# Patient Record
Sex: FEMALE | Race: WHITE | NOT HISPANIC OR LATINO | Employment: FULL TIME | URBAN - METROPOLITAN AREA
[De-identification: names, ages, dates, MRNs, and addresses within clinical notes are randomized per-mention and may not be internally consistent; named-entity substitution may affect disease eponyms.]

---

## 2023-03-13 DIAGNOSIS — I10 BENIGN ESSENTIAL HYPERTENSION: Primary | ICD-10-CM

## 2023-03-13 PROBLEM — R31.29 OTHER MICROSCOPIC HEMATURIA: Status: ACTIVE | Noted: 2023-03-13

## 2023-03-13 PROBLEM — M85.80 OSTEOPENIA: Status: ACTIVE | Noted: 2023-03-13

## 2023-03-13 PROBLEM — R92.30 BREAST DENSITY: Status: ACTIVE | Noted: 2023-03-13

## 2023-03-13 PROBLEM — E55.9 VITAMIN D DEFICIENCY: Status: ACTIVE | Noted: 2023-03-13

## 2023-03-13 PROBLEM — E78.5 BORDERLINE HYPERLIPIDEMIA: Status: ACTIVE | Noted: 2023-03-13

## 2023-03-13 PROBLEM — K57.90 DIVERTICULOSIS: Status: ACTIVE | Noted: 2023-03-13

## 2023-03-13 PROBLEM — K64.4 EXTERNAL HEMORRHOIDS: Status: ACTIVE | Noted: 2023-03-13

## 2023-03-13 PROBLEM — D72.819 WBC DECREASED: Status: ACTIVE | Noted: 2023-03-13

## 2023-03-13 PROBLEM — H61.21 IMPACTED CERUMEN OF RIGHT EAR: Status: ACTIVE | Noted: 2023-03-13

## 2023-03-13 RX ORDER — LISINOPRIL 20 MG/1
TABLET ORAL
Qty: 90 TABLET | Refills: 1 | Status: SHIPPED | OUTPATIENT
Start: 2023-03-13 | End: 2023-09-05 | Stop reason: SDUPTHER

## 2023-03-13 RX ORDER — ACETAMINOPHEN 500 MG
1 TABLET ORAL DAILY
COMMUNITY
Start: 2018-08-21

## 2023-03-13 RX ORDER — LISINOPRIL 20 MG/1
20 TABLET ORAL DAILY
COMMUNITY
End: 2023-08-11 | Stop reason: WASHOUT

## 2023-08-09 ASSESSMENT — PROMIS GLOBAL HEALTH SCALE
RATE_AVERAGE_PAIN: 1
RATE_QUALITY_OF_LIFE: EXCELLENT
CARRYOUT_PHYSICAL_ACTIVITIES: COMPLETELY
RATE_SOCIAL_SATISFACTION: EXCELLENT
RATE_MENTAL_HEALTH: EXCELLENT
EMOTIONAL_PROBLEMS: NEVER
RATE_PHYSICAL_HEALTH: EXCELLENT
RATE_GENERAL_HEALTH: EXCELLENT
CARRYOUT_SOCIAL_ACTIVITIES: EXCELLENT

## 2023-08-11 ENCOUNTER — OFFICE VISIT (OUTPATIENT)
Dept: PRIMARY CARE | Facility: CLINIC | Age: 63
End: 2023-08-11
Payer: COMMERCIAL

## 2023-08-11 VITALS
WEIGHT: 129.7 LBS | HEART RATE: 64 BPM | BODY MASS INDEX: 22.98 KG/M2 | DIASTOLIC BLOOD PRESSURE: 75 MMHG | SYSTOLIC BLOOD PRESSURE: 118 MMHG | HEIGHT: 63 IN | RESPIRATION RATE: 14 BRPM

## 2023-08-11 DIAGNOSIS — Z00.00 WELL ADULT EXAM: Primary | ICD-10-CM

## 2023-08-11 DIAGNOSIS — E55.9 VITAMIN D DEFICIENCY: ICD-10-CM

## 2023-08-11 DIAGNOSIS — M85.80 OSTEOPENIA, UNSPECIFIED LOCATION: ICD-10-CM

## 2023-08-11 DIAGNOSIS — I10 BENIGN ESSENTIAL HYPERTENSION: ICD-10-CM

## 2023-08-11 LAB
ALANINE AMINOTRANSFERASE (SGPT) (U/L) IN SER/PLAS: 19 U/L (ref 7–45)
ALBUMIN (G/DL) IN SER/PLAS: 4.7 G/DL (ref 3.4–5)
ALKALINE PHOSPHATASE (U/L) IN SER/PLAS: 86 U/L (ref 33–136)
ANION GAP IN SER/PLAS: 13 MMOL/L (ref 10–20)
APPEARANCE, URINE: NORMAL
ASPARTATE AMINOTRANSFERASE (SGOT) (U/L) IN SER/PLAS: 21 U/L (ref 9–39)
BASOPHILS (10*3/UL) IN BLOOD BY AUTOMATED COUNT: 0.01 X10E9/L (ref 0–0.1)
BASOPHILS/100 LEUKOCYTES IN BLOOD BY AUTOMATED COUNT: 0.2 % (ref 0–2)
BILIRUBIN TOTAL (MG/DL) IN SER/PLAS: 1 MG/DL (ref 0–1.2)
BILIRUBIN, URINE: NEGATIVE
BLOOD, URINE: NEGATIVE
CALCIDIOL (25 OH VITAMIN D3) (NG/ML) IN SER/PLAS: 47 NG/ML
CALCIUM (MG/DL) IN SER/PLAS: 9.9 MG/DL (ref 8.6–10.6)
CARBON DIOXIDE, TOTAL (MMOL/L) IN SER/PLAS: 27 MMOL/L (ref 21–32)
CHLORIDE (MMOL/L) IN SER/PLAS: 104 MMOL/L (ref 98–107)
CHOLESTEROL (MG/DL) IN SER/PLAS: 267 MG/DL (ref 0–199)
CHOLESTEROL IN HDL (MG/DL) IN SER/PLAS: 84.2 MG/DL
CHOLESTEROL/HDL RATIO: 3.2
COLOR, URINE: YELLOW
CREATININE (MG/DL) IN SER/PLAS: 0.93 MG/DL (ref 0.5–1.05)
EOSINOPHILS (10*3/UL) IN BLOOD BY AUTOMATED COUNT: 0.04 X10E9/L (ref 0–0.7)
EOSINOPHILS/100 LEUKOCYTES IN BLOOD BY AUTOMATED COUNT: 0.9 % (ref 0–6)
ERYTHROCYTE DISTRIBUTION WIDTH (RATIO) BY AUTOMATED COUNT: 12.8 % (ref 11.5–14.5)
ERYTHROCYTE MEAN CORPUSCULAR HEMOGLOBIN CONCENTRATION (G/DL) BY AUTOMATED: 33.8 G/DL (ref 32–36)
ERYTHROCYTE MEAN CORPUSCULAR VOLUME (FL) BY AUTOMATED COUNT: 87 FL (ref 80–100)
ERYTHROCYTES (10*6/UL) IN BLOOD BY AUTOMATED COUNT: 4.91 X10E12/L (ref 4–5.2)
GFR FEMALE: 70 ML/MIN/1.73M2
GLUCOSE (MG/DL) IN SER/PLAS: 92 MG/DL (ref 74–99)
GLUCOSE, URINE: NEGATIVE MG/DL
HEMATOCRIT (%) IN BLOOD BY AUTOMATED COUNT: 42.9 % (ref 36–46)
HEMOGLOBIN (G/DL) IN BLOOD: 14.5 G/DL (ref 12–16)
IMMATURE GRANULOCYTES/100 LEUKOCYTES IN BLOOD BY AUTOMATED COUNT: 0.2 % (ref 0–0.9)
KETONES, URINE: NEGATIVE MG/DL
LDL: 169 MG/DL (ref 0–99)
LEUKOCYTE ESTERASE, URINE: NEGATIVE
LEUKOCYTES (10*3/UL) IN BLOOD BY AUTOMATED COUNT: 4.3 X10E9/L (ref 4.4–11.3)
LYMPHOCYTES (10*3/UL) IN BLOOD BY AUTOMATED COUNT: 1.34 X10E9/L (ref 1.2–4.8)
LYMPHOCYTES/100 LEUKOCYTES IN BLOOD BY AUTOMATED COUNT: 31.5 % (ref 13–44)
MONOCYTES (10*3/UL) IN BLOOD BY AUTOMATED COUNT: 0.41 X10E9/L (ref 0.1–1)
MONOCYTES/100 LEUKOCYTES IN BLOOD BY AUTOMATED COUNT: 9.6 % (ref 2–10)
NEUTROPHILS (10*3/UL) IN BLOOD BY AUTOMATED COUNT: 2.45 X10E9/L (ref 1.2–7.7)
NEUTROPHILS/100 LEUKOCYTES IN BLOOD BY AUTOMATED COUNT: 57.6 % (ref 40–80)
NITRITE, URINE: NEGATIVE
NRBC (PER 100 WBCS) BY AUTOMATED COUNT: 0 /100 WBC (ref 0–0)
PH, URINE: 5 (ref 5–8)
PLATELETS (10*3/UL) IN BLOOD AUTOMATED COUNT: 180 X10E9/L (ref 150–450)
POTASSIUM (MMOL/L) IN SER/PLAS: 4.3 MMOL/L (ref 3.5–5.3)
PROTEIN TOTAL: 7.4 G/DL (ref 6.4–8.2)
PROTEIN, URINE: NEGATIVE MG/DL
SODIUM (MMOL/L) IN SER/PLAS: 140 MMOL/L (ref 136–145)
SPECIFIC GRAVITY, URINE: 1.01 (ref 1–1.03)
THYROTROPIN (MIU/L) IN SER/PLAS BY DETECTION LIMIT <= 0.05 MIU/L: 0.93 MIU/L (ref 0.44–3.98)
TRIGLYCERIDE (MG/DL) IN SER/PLAS: 71 MG/DL (ref 0–149)
UREA NITROGEN (MG/DL) IN SER/PLAS: 22 MG/DL (ref 6–23)
UROBILINOGEN, URINE: <2 MG/DL (ref 0–1.9)
VLDL: 14 MG/DL (ref 0–40)

## 2023-08-11 PROCEDURE — 93000 ELECTROCARDIOGRAM COMPLETE: CPT | Performed by: INTERNAL MEDICINE

## 2023-08-11 PROCEDURE — 84443 ASSAY THYROID STIM HORMONE: CPT

## 2023-08-11 PROCEDURE — 3078F DIAST BP <80 MM HG: CPT | Performed by: INTERNAL MEDICINE

## 2023-08-11 PROCEDURE — 82306 VITAMIN D 25 HYDROXY: CPT

## 2023-08-11 PROCEDURE — 85025 COMPLETE CBC W/AUTO DIFF WBC: CPT

## 2023-08-11 PROCEDURE — 80061 LIPID PANEL: CPT

## 2023-08-11 PROCEDURE — 81003 URINALYSIS AUTO W/O SCOPE: CPT

## 2023-08-11 PROCEDURE — 80053 COMPREHEN METABOLIC PANEL: CPT

## 2023-08-11 PROCEDURE — 99396 PREV VISIT EST AGE 40-64: CPT | Performed by: INTERNAL MEDICINE

## 2023-08-11 PROCEDURE — 3074F SYST BP LT 130 MM HG: CPT | Performed by: INTERNAL MEDICINE

## 2023-08-11 PROCEDURE — 1036F TOBACCO NON-USER: CPT | Performed by: INTERNAL MEDICINE

## 2023-08-11 ASSESSMENT — ENCOUNTER SYMPTOMS
WHEEZING: 0
WOUND: 0
DIFFICULTY URINATING: 0
EYE ITCHING: 0
MYALGIAS: 0
DIARRHEA: 0
POLYPHAGIA: 0
APPETITE CHANGE: 0
ABDOMINAL PAIN: 0
CONSTIPATION: 0
CHILLS: 0
NUMBNESS: 0
COUGH: 0
JOINT SWELLING: 0
HEADACHES: 0
SHORTNESS OF BREATH: 0
DIZZINESS: 0
EYE PAIN: 0
FREQUENCY: 0
FATIGUE: 0
ACTIVITY CHANGE: 0
DYSPHORIC MOOD: 0
BRUISES/BLEEDS EASILY: 0
POLYDIPSIA: 0
WEAKNESS: 0
VOMITING: 0
CONFUSION: 0
FACIAL SWELLING: 0
SLEEP DISTURBANCE: 0
RHINORRHEA: 0
BACK PAIN: 0
ARTHRALGIAS: 0
HEMATURIA: 0
SINUS PRESSURE: 0
FEVER: 0
PALPITATIONS: 0
SORE THROAT: 0
FLANK PAIN: 0
CHEST TIGHTNESS: 0
ADENOPATHY: 0
DIAPHORESIS: 0
NAUSEA: 0
LIGHT-HEADEDNESS: 0
NERVOUS/ANXIOUS: 0
BLOOD IN STOOL: 0
DYSURIA: 0
NECK PAIN: 0
EYE DISCHARGE: 0

## 2023-08-11 NOTE — ASSESSMENT & PLAN NOTE
mild thus advise continued calcium, vitamin D supplement and weightbearing exercises  Advise rechecking DEXA bone density November 2023

## 2023-08-11 NOTE — ASSESSMENT & PLAN NOTE
Component white coat hypertension with ambulatory BPs excellent   Continue Lisinopril 20 mg daily; continue check ambulatory blood pressures, DASH diet and exercise;   Previously offered echo - patient declined

## 2023-08-11 NOTE — ASSESSMENT & PLAN NOTE
Fasting blood work/UA ordered  EKG done in office  Pap 6/15/20 with Dr Aguilar  Mammogram 9/21/2022 with Dr Aguilar   Dexa bone density 11/22/21   Colonoscopy 11/16/2022, repeat 5 years

## 2023-08-11 NOTE — PROGRESS NOTES
"Subjective   Patient ID: Alisia Eisenberg is a 62 y.o. female who presents for Annual Exam.    She brought in record of her home Bps: 119/75, 110/74, 121/81, 119/72, 120/80, 130/76, 130/93, 129/88, 109/77, 126/74, 118/75    Current exercise regimen includes spinning, running and walking with some weights.     She reports skin of right arm itching but no rash        Review of Systems   Constitutional:  Negative for activity change, appetite change, chills, diaphoresis, fatigue and fever.   HENT:  Negative for congestion, ear discharge, ear pain, facial swelling, postnasal drip, rhinorrhea, sinus pressure and sore throat.    Eyes:  Negative for pain, discharge and itching.   Respiratory:  Negative for cough, chest tightness, shortness of breath and wheezing.    Cardiovascular:  Negative for chest pain, palpitations and leg swelling.   Gastrointestinal:  Negative for abdominal pain, blood in stool, constipation, diarrhea, nausea and vomiting.   Endocrine: Negative for cold intolerance, heat intolerance, polydipsia, polyphagia and polyuria.   Genitourinary:  Negative for difficulty urinating, dysuria, flank pain, frequency and hematuria.   Musculoskeletal:  Negative for arthralgias, back pain, joint swelling, myalgias and neck pain.   Skin:  Negative for rash and wound.        \"Itchy skin\" right arm   Neurological:  Negative for dizziness, syncope, weakness, light-headedness, numbness and headaches.   Hematological:  Negative for adenopathy. Does not bruise/bleed easily.   Psychiatric/Behavioral:  Negative for behavioral problems, confusion, dysphoric mood, sleep disturbance and suicidal ideas. The patient is not nervous/anxious.        Objective   /75 Comment: Home BP  Pulse 64   Resp 14   Ht 1.6 m (5' 3\")   Wt 58.8 kg (129 lb 11.2 oz)   BMI 22.98 kg/m²     Physical Exam  Constitutional:       Appearance: Normal appearance. She is normal weight. She is not ill-appearing.   HENT:      Head: Normocephalic " and atraumatic.      Right Ear: Tympanic membrane, ear canal and external ear normal.      Left Ear: Tympanic membrane, ear canal and external ear normal.      Nose: Nose normal.      Mouth/Throat:      Mouth: Mucous membranes are moist.      Pharynx: Oropharynx is clear.   Eyes:      General: No scleral icterus.     Extraocular Movements: Extraocular movements intact.      Conjunctiva/sclera: Conjunctivae normal.      Pupils: Pupils are equal, round, and reactive to light.   Cardiovascular:      Rate and Rhythm: Normal rate and regular rhythm.      Heart sounds: No murmur heard.     No gallop.   Pulmonary:      Effort: Pulmonary effort is normal. No respiratory distress.      Breath sounds: No wheezing, rhonchi or rales.   Chest:      Comments: Breast exam deferred to Dr Aguilar  Abdominal:      General: Bowel sounds are normal. There is no distension.      Palpations: Abdomen is soft. There is no mass.      Tenderness: There is no abdominal tenderness.      Hernia: No hernia is present.   Genitourinary:     Comments: Pelvic exam deferred to Dr Aguilar    Musculoskeletal:         General: No swelling, tenderness or deformity. Normal range of motion.      Cervical back: Normal range of motion and neck supple.   Skin:     General: Skin is warm and dry.      Findings: No rash.   Neurological:      General: No focal deficit present.      Mental Status: She is alert and oriented to person, place, and time.      Cranial Nerves: No cranial nerve deficit.      Motor: No weakness.      Deep Tendon Reflexes: Reflexes normal.   Psychiatric:         Mood and Affect: Mood normal.         Behavior: Behavior normal.         Thought Content: Thought content normal.         Judgment: Judgment normal.       Assessment/Plan   Problem List Items Addressed This Visit       Benign essential hypertension - Primary     Component white coat hypertension with ambulatory BPs excellent   Continue Lisinopril 20 mg daily; continue check  ambulatory blood pressures, DASH diet and exercise;   Previously offered echo - patient declined         Relevant Orders    Comprehensive Metabolic Panel    ECG 12 lead    Osteopenia     mild thus advise continued calcium, vitamin D supplement and weightbearing exercises  Advise rechecking DEXA bone density November 2023           Relevant Orders    Vitamin D, Total    Vitamin D deficiency     Continues vitamin D 2000 international units daily   vitamin D 25-hydroxy and further advise         Relevant Orders    Vitamin D, Total    Well adult exam     Fasting blood work/UA ordered  EKG done in office  Pap 6/15/20 with Dr Aguilar  Mammogram 9/21/2022 with Dr Aguilar   Dexa bone density 11/22/21   Colonoscopy 11/16/2022, repeat 5 years          Relevant Orders    CBC and Auto Differential    Comprehensive Metabolic Panel    ECG 12 lead    Lipid Panel    Vitamin D, Total    Urinalysis with Reflex Microscopic and Culture    TSH with reflex to Free T4 if abnormal

## 2023-09-05 DIAGNOSIS — I10 BENIGN ESSENTIAL HYPERTENSION: ICD-10-CM

## 2023-09-05 RX ORDER — LISINOPRIL 20 MG/1
20 TABLET ORAL DAILY
Qty: 90 TABLET | Refills: 1 | Status: SHIPPED | OUTPATIENT
Start: 2023-09-05 | End: 2023-12-15 | Stop reason: SDUPTHER

## 2023-10-31 RX ORDER — DEXTROMETHORPHAN HBR. AND GUAIFENESIN 10; 100 MG/5ML; MG/5ML
5 SOLUTION ORAL 4 TIMES DAILY PRN
COMMUNITY
Start: 2016-01-15 | End: 2023-11-01

## 2023-11-01 ENCOUNTER — ANCILLARY PROCEDURE (OUTPATIENT)
Dept: RADIOLOGY | Facility: CLINIC | Age: 63
End: 2023-11-01
Payer: COMMERCIAL

## 2023-11-01 ENCOUNTER — OFFICE VISIT (OUTPATIENT)
Dept: OBSTETRICS AND GYNECOLOGY | Facility: CLINIC | Age: 63
End: 2023-11-01
Payer: COMMERCIAL

## 2023-11-01 VITALS
DIASTOLIC BLOOD PRESSURE: 88 MMHG | HEIGHT: 63 IN | BODY MASS INDEX: 23.74 KG/M2 | WEIGHT: 134 LBS | SYSTOLIC BLOOD PRESSURE: 136 MMHG

## 2023-11-01 DIAGNOSIS — Z01.419 WELL WOMAN EXAM: Primary | ICD-10-CM

## 2023-11-01 DIAGNOSIS — Z12.4 CERVICAL CANCER SCREENING: ICD-10-CM

## 2023-11-01 DIAGNOSIS — Z12.31 ENCOUNTER FOR SCREENING MAMMOGRAM FOR MALIGNANT NEOPLASM OF BREAST: ICD-10-CM

## 2023-11-01 PROCEDURE — 87624 HPV HI-RISK TYP POOLED RSLT: CPT

## 2023-11-01 PROCEDURE — 99396 PREV VISIT EST AGE 40-64: CPT | Performed by: OBSTETRICS & GYNECOLOGY

## 2023-11-01 PROCEDURE — 1036F TOBACCO NON-USER: CPT | Performed by: OBSTETRICS & GYNECOLOGY

## 2023-11-01 PROCEDURE — 77067 SCR MAMMO BI INCL CAD: CPT

## 2023-11-01 PROCEDURE — 3075F SYST BP GE 130 - 139MM HG: CPT | Performed by: OBSTETRICS & GYNECOLOGY

## 2023-11-01 PROCEDURE — 3079F DIAST BP 80-89 MM HG: CPT | Performed by: OBSTETRICS & GYNECOLOGY

## 2023-11-01 PROCEDURE — 77067 SCR MAMMO BI INCL CAD: CPT | Mod: BILATERAL PROCEDURE | Performed by: RADIOLOGY

## 2023-11-01 PROCEDURE — 77063 BREAST TOMOSYNTHESIS BI: CPT

## 2023-11-01 PROCEDURE — 77063 BREAST TOMOSYNTHESIS BI: CPT | Mod: BILATERAL PROCEDURE | Performed by: RADIOLOGY

## 2023-11-01 PROCEDURE — 88141 CYTOPATH C/V INTERPRET: CPT | Performed by: STUDENT IN AN ORGANIZED HEALTH CARE EDUCATION/TRAINING PROGRAM

## 2023-11-01 PROCEDURE — 88142 CYTOPATH C/V THIN LAYER: CPT

## 2023-11-01 ASSESSMENT — ENCOUNTER SYMPTOMS
VOMITING: 0
FLANK PAIN: 0
CONSTIPATION: 0
UNEXPECTED WEIGHT CHANGE: 0
COLOR CHANGE: 0
HEMATURIA: 0
APPETITE CHANGE: 0
DYSURIA: 0
FEVER: 0
ABDOMINAL PAIN: 0
FREQUENCY: 0
FATIGUE: 0
BACK PAIN: 0
SLEEP DISTURBANCE: 0
ABDOMINAL DISTENTION: 0
CHILLS: 0
DIARRHEA: 0
BLOOD IN STOOL: 0
NAUSEA: 0
SHORTNESS OF BREATH: 0

## 2023-11-01 ASSESSMENT — PAIN SCALES - GENERAL: PAINLEVEL: 0-NO PAIN

## 2023-11-01 NOTE — PROGRESS NOTES
History Of Present Illness  Routine Gyn Exam  Alisia Eisenberg here for routine WWE.  Pt is postmenopausal.  Denies spotting or bleeding.     Current complaints: none.     New health issues or surgeries since last visit: denies.  Exercise: regular: walking, spinning, weights.     Gynecologic History  Postmenopausal.  Sexually active: yes with one partner/.  Last Pap: .   Last mammogram: this morning.   Last Colonoscopy:  .   Last DEXA:  (normal).     Obstetric History  OB History    Para Term  AB Living   4 3 3   1 3   SAB IAB Ectopic Multiple Live Births   1       3      # Outcome Date GA Lbr Kendrick/2nd Weight Sex Delivery Anes PTL Lv   4 Term 99     Vag-Spont   BOGDAN   3 Term 94     Vag-Spont   BOGDAN   2 SAB 1993           1 Term 91     Vag-Spont   BOGDAN        Past Medical History  She has a past medical history of Hypertension and Personal history of other mental and behavioral disorders (2014).    Surgical History  She has a past surgical history that includes Other surgical history (2016).     Social History  She reports that she has never smoked. She has never used smokeless tobacco. She reports current alcohol use of about 3.0 standard drinks of alcohol per week. She reports that she does not use drugs.    Family History  Family History   Problem Relation Name Age of Onset    Arthritis Mother Johanna Andersen     Cancer Mother Johanna Andersen     Diabetes Mother Johanna Andersen     Hearing loss Mother Johanna Andersen     Hypertension Mother Johanna Andersen     Breast cancer Mother Johanna Andersen     Other (Adenocarcinoma in situ of cervix) Mother Johanna Andersen     Cancer Sister Erin yoke     Depression Sister Erin yosonam     Hypertension Sister Erin yosonam     Breast cancer Sister Erin yosonam     Colon cancer Sister Erin yoke     BRCA1 Negative Sister Erin yosonam     Rectal cancer Sister Erin yosonam     Depression Daughter Nhi  "Faina     Birth defects Son Richard Eisenberg     Arthritis Mother's Sister Lorena Osullivan     Arthritis Maternal Grandmother Lydia Low     Cancer Maternal Grandmother Lydia Low     Hypertension Maternal Grandmother Lydia Low     Colon cancer Maternal Grandmother Lydia Low     Hypertension Paternal Grandmother Mounika Andersen     Colon cancer Other Grandmother     Colon cancer Other Grandfather         Allergies  Amoxicillin    Review of Systems   Constitutional:  Negative for appetite change, chills, fatigue, fever and unexpected weight change.   Respiratory:  Negative for shortness of breath.    Cardiovascular:  Negative for chest pain.   Gastrointestinal:  Negative for abdominal distention, abdominal pain, blood in stool, constipation, diarrhea, nausea and vomiting.   Endocrine: Negative for cold intolerance and heat intolerance.   Genitourinary:  Negative for dyspareunia, dysuria, flank pain, frequency, genital sores, hematuria, menstrual problem, pelvic pain, urgency, vaginal bleeding, vaginal discharge and vaginal pain.   Musculoskeletal:  Negative for back pain.   Skin:  Negative for color change.   Psychiatric/Behavioral:  Negative for sleep disturbance.        /88   Ht 1.6 m (5' 3\")   Wt 60.8 kg (134 lb)   BMI 23.74 kg/m²      Physical Exam  Constitutional:       Appearance: Normal appearance.   HENT:      Head: Normocephalic and atraumatic.   Chest:   Breasts:     Right: Normal.      Left: Normal.   Abdominal:      General: Abdomen is flat.      Palpations: Abdomen is soft.      Tenderness: There is no abdominal tenderness.   Genitourinary:     General: Normal vulva.      Vagina: Normal.      Cervix: Normal.      Uterus: Normal.       Adnexa: Right adnexa normal and left adnexa normal.      Comments: Pap smear collected today   Skin:     General: Skin is warm and dry.   Neurological:      Mental Status: She is alert and oriented to person, place, and time.   Psychiatric:        " " Mood and Affect: Mood normal.          Last Recorded Vitals  Blood pressure 136/88, height 1.6 m (5' 3\"), weight 60.8 kg (134 lb).         Assessment/Plan         Routine Well Woman Exam Today  Discussed diet and exercise.   Reviewed routine health screenings.   Pap: pap done today   Recommend annual mammograms.                Sindhu Aguilar MD  "

## 2023-11-17 LAB
CYTOLOGY CMNT CVX/VAG CYTO-IMP: NORMAL
HPV HR 12 DNA GENITAL QL NAA+PROBE: NEGATIVE
HPV HR GENOTYPES PNL CVX NAA+PROBE: NEGATIVE
HPV16 DNA SPEC QL NAA+PROBE: NEGATIVE
HPV18 DNA SPEC QL NAA+PROBE: NEGATIVE
LAB AP HPV GENOTYPE QUESTION: YES
LAB AP HPV HR: NORMAL
LABORATORY COMMENT REPORT: NORMAL
LABORATORY COMMENT REPORT: NORMAL
PATH REPORT.TOTAL CANCER: NORMAL

## 2023-12-15 DIAGNOSIS — I10 BENIGN ESSENTIAL HYPERTENSION: ICD-10-CM

## 2023-12-15 RX ORDER — LISINOPRIL 20 MG/1
20 TABLET ORAL DAILY
Qty: 90 TABLET | Refills: 0 | Status: SHIPPED | OUTPATIENT
Start: 2023-12-15

## 2024-10-08 ENCOUNTER — HOSPITAL ENCOUNTER (OUTPATIENT)
Dept: RADIOLOGY | Facility: CLINIC | Age: 64
Discharge: HOME | End: 2024-10-08
Payer: COMMERCIAL

## 2024-10-08 DIAGNOSIS — Z13.6 ENCOUNTER FOR SCREENING FOR CARDIOVASCULAR DISORDERS: ICD-10-CM

## 2024-10-08 PROCEDURE — 75571 CT HRT W/O DYE W/CA TEST: CPT

## 2024-11-04 ENCOUNTER — APPOINTMENT (OUTPATIENT)
Dept: OBSTETRICS AND GYNECOLOGY | Facility: CLINIC | Age: 64
End: 2024-11-04
Payer: COMMERCIAL

## 2024-11-04 ENCOUNTER — HOSPITAL ENCOUNTER (OUTPATIENT)
Dept: RADIOLOGY | Facility: CLINIC | Age: 64
Discharge: HOME | End: 2024-11-04
Payer: COMMERCIAL

## 2024-11-04 VITALS — BODY MASS INDEX: 22.15 KG/M2 | HEIGHT: 63 IN | WEIGHT: 125 LBS

## 2024-11-04 VITALS
BODY MASS INDEX: 22.15 KG/M2 | DIASTOLIC BLOOD PRESSURE: 90 MMHG | HEIGHT: 63 IN | SYSTOLIC BLOOD PRESSURE: 140 MMHG | WEIGHT: 125 LBS

## 2024-11-04 DIAGNOSIS — Z12.31 ENCOUNTER FOR SCREENING MAMMOGRAM FOR MALIGNANT NEOPLASM OF BREAST: ICD-10-CM

## 2024-11-04 DIAGNOSIS — Z01.419 WELL WOMAN EXAM: Primary | ICD-10-CM

## 2024-11-04 PROCEDURE — 77063 BREAST TOMOSYNTHESIS BI: CPT | Performed by: RADIOLOGY

## 2024-11-04 PROCEDURE — 3080F DIAST BP >= 90 MM HG: CPT | Performed by: OBSTETRICS & GYNECOLOGY

## 2024-11-04 PROCEDURE — 3008F BODY MASS INDEX DOCD: CPT | Performed by: OBSTETRICS & GYNECOLOGY

## 2024-11-04 PROCEDURE — 99396 PREV VISIT EST AGE 40-64: CPT | Performed by: OBSTETRICS & GYNECOLOGY

## 2024-11-04 PROCEDURE — 1036F TOBACCO NON-USER: CPT | Performed by: OBSTETRICS & GYNECOLOGY

## 2024-11-04 PROCEDURE — 77067 SCR MAMMO BI INCL CAD: CPT

## 2024-11-04 PROCEDURE — 77067 SCR MAMMO BI INCL CAD: CPT | Performed by: RADIOLOGY

## 2024-11-04 PROCEDURE — 3077F SYST BP >= 140 MM HG: CPT | Performed by: OBSTETRICS & GYNECOLOGY

## 2024-11-04 ASSESSMENT — PAIN SCALES - GENERAL: PAINLEVEL_OUTOF10: 0-NO PAIN

## 2024-11-04 ASSESSMENT — ENCOUNTER SYMPTOMS
SLEEP DISTURBANCE: 0
ABDOMINAL DISTENTION: 0
DYSURIA: 0
FEVER: 0
BACK PAIN: 0
ABDOMINAL PAIN: 0
HEMATURIA: 0
FREQUENCY: 0
CHILLS: 0
BLOOD IN STOOL: 0
DIARRHEA: 0
FLANK PAIN: 0
APPETITE CHANGE: 0
VOMITING: 0
SHORTNESS OF BREATH: 0
NAUSEA: 0
UNEXPECTED WEIGHT CHANGE: 0
COLOR CHANGE: 0
CONSTIPATION: 0
FATIGUE: 0

## 2024-11-04 NOTE — PROGRESS NOTES
"History Of Present Illness  Routine Gyn Exam  Alisia Eisenberg here for routine WWE.  Pt is postmenopausal.  Denies spotting or bleeding.     Concerns: none.     Medical and surgical histories reviewed with patient.   Exercise: walking, weights, jogging, spinning.     Gynecologic History  Postmenopausal.  Sexually active: yes with one partner/ .  Last Pap: .   Last mammogram: .   Last Colonoscopy:  .   Last DEXA: .     Obstetric History  OB History    Para Term  AB Living   4 3 3   1 3   SAB IAB Ectopic Multiple Live Births   1       3      # Outcome Date GA Lbr Kendrick/2nd Weight Sex Type Anes PTL Lv   4 Term 99     Vag-Spont   BOGDAN   3 Term 94     Vag-Spont   BOGDAN   2 SAB            1 Term 91     Vag-Spont   BOGDAN        Review of Systems   Constitutional:  Negative for appetite change, chills, fatigue, fever and unexpected weight change.   Respiratory:  Negative for shortness of breath.    Cardiovascular:  Negative for chest pain.   Gastrointestinal:  Negative for abdominal distention, abdominal pain, blood in stool, constipation, diarrhea, nausea and vomiting.   Endocrine: Negative for cold intolerance and heat intolerance.   Genitourinary:  Negative for dyspareunia, dysuria, flank pain, frequency, genital sores, hematuria, menstrual problem, pelvic pain, urgency, vaginal bleeding, vaginal discharge and vaginal pain.   Musculoskeletal:  Negative for back pain.   Skin:  Negative for color change.   Psychiatric/Behavioral:  Negative for sleep disturbance.        /90 (BP Location: Right arm, Patient Position: Sitting)   Ht 1.6 m (5' 3\")   Wt 56.7 kg (125 lb)   BMI 22.14 kg/m²      Physical Exam  Constitutional:       Appearance: Normal appearance.   HENT:      Head: Normocephalic and atraumatic.   Chest:   Breasts:     Right: Normal.      Left: Normal.   Abdominal:      General: Abdomen is flat.      Palpations: Abdomen is soft.      Tenderness: There " is no abdominal tenderness.   Genitourinary:     General: Normal vulva.      Vagina: Normal.      Cervix: Normal.      Uterus: Normal.       Adnexa: Right adnexa normal and left adnexa normal.   Skin:     General: Skin is warm and dry.   Neurological:      Mental Status: She is alert and oriented to person, place, and time.   Psychiatric:         Mood and Affect: Mood normal.              Assessment/Plan         Routine Well Woman Exam Today  Discussed diet and exercise.   Reviewed routine health screenings.   Pap: pap 2023 wnl   Recommend annual mammograms. Mammogram scheduled for today  Currently up to date on colon cancer screening.                  Sindhu Aguilar MD

## 2024-11-08 ENCOUNTER — TELEPHONE (OUTPATIENT)
Dept: OBSTETRICS AND GYNECOLOGY | Facility: CLINIC | Age: 64
End: 2024-11-08
Payer: COMMERCIAL

## 2024-11-08 DIAGNOSIS — R92.8 ABNORMAL MAMMOGRAM: ICD-10-CM

## 2024-11-08 NOTE — TELEPHONE ENCOUNTER
Called patient to discuss results and orders placed  Identified by name and   Informed patient of results and recommenced follow up according to mammogram notes  Discussed that RN pended orders to provider to review and sign, and once these are in patient my [facility] to get follow up scheduled  Patient verbalized understanding, all questions/concerns addressed at this time.    MADI LincolnN RN

## 2024-11-08 NOTE — TELEPHONE ENCOUNTER
pt states that she had a mammogram and it states that she needs a second mammogram and ultrasound. Pt is requesting an order.

## 2024-11-13 ENCOUNTER — HOSPITAL ENCOUNTER (OUTPATIENT)
Dept: RADIOLOGY | Facility: CLINIC | Age: 64
Discharge: HOME | End: 2024-11-13
Payer: COMMERCIAL

## 2024-11-13 DIAGNOSIS — R92.8 ABNORMAL MAMMOGRAM: ICD-10-CM

## 2024-11-13 PROCEDURE — 77061 BREAST TOMOSYNTHESIS UNI: CPT | Mod: RIGHT SIDE | Performed by: STUDENT IN AN ORGANIZED HEALTH CARE EDUCATION/TRAINING PROGRAM

## 2024-11-13 PROCEDURE — 76642 ULTRASOUND BREAST LIMITED: CPT | Mod: RIGHT SIDE | Performed by: STUDENT IN AN ORGANIZED HEALTH CARE EDUCATION/TRAINING PROGRAM

## 2024-11-13 PROCEDURE — 76982 USE 1ST TARGET LESION: CPT | Mod: RT

## 2024-11-13 PROCEDURE — 77065 DX MAMMO INCL CAD UNI: CPT | Mod: RIGHT SIDE | Performed by: STUDENT IN AN ORGANIZED HEALTH CARE EDUCATION/TRAINING PROGRAM

## 2024-11-13 PROCEDURE — 77061 BREAST TOMOSYNTHESIS UNI: CPT | Mod: RT

## 2024-11-13 PROCEDURE — 76642 ULTRASOUND BREAST LIMITED: CPT | Mod: RT

## 2024-11-18 ENCOUNTER — TELEPHONE (OUTPATIENT)
Dept: OBSTETRICS AND GYNECOLOGY | Facility: CLINIC | Age: 64
End: 2024-11-18
Payer: COMMERCIAL

## 2024-11-18 NOTE — TELEPHONE ENCOUNTER
LM for patient to ensure she has follow up scheduled (biopsy) for recent abnormal mammogram findings.

## 2024-11-19 NOTE — TELEPHONE ENCOUNTER
Patient is returning providers call, you may leave a detailed message in the event she cant answer. patient will be busy until 4pm

## 2024-11-26 ENCOUNTER — OFFICE VISIT (OUTPATIENT)
Dept: HEMATOLOGY/ONCOLOGY | Facility: HOSPITAL | Age: 64
End: 2024-11-26
Payer: COMMERCIAL

## 2024-11-26 ENCOUNTER — HOSPITAL ENCOUNTER (OUTPATIENT)
Dept: RADIOLOGY | Facility: HOSPITAL | Age: 64
Discharge: HOME | End: 2024-11-26
Payer: COMMERCIAL

## 2024-11-26 VITALS
WEIGHT: 127.21 LBS | RESPIRATION RATE: 18 BRPM | BODY MASS INDEX: 22.54 KG/M2 | OXYGEN SATURATION: 96 % | HEART RATE: 64 BPM | SYSTOLIC BLOOD PRESSURE: 153 MMHG | DIASTOLIC BLOOD PRESSURE: 82 MMHG | TEMPERATURE: 97.9 F | HEIGHT: 63 IN

## 2024-11-26 DIAGNOSIS — N63.11 MASS OF UPPER OUTER QUADRANT OF RIGHT BREAST: ICD-10-CM

## 2024-11-26 DIAGNOSIS — R92.8 OTHER ABNORMAL AND INCONCLUSIVE FINDINGS ON DIAGNOSTIC IMAGING OF BREAST: ICD-10-CM

## 2024-11-26 DIAGNOSIS — N63.10 BREAST MASS, RIGHT: ICD-10-CM

## 2024-11-26 DIAGNOSIS — Z01.818 PRE-PROCEDURAL EXAMINATION: Primary | ICD-10-CM

## 2024-11-26 PROCEDURE — 3008F BODY MASS INDEX DOCD: CPT | Performed by: NURSE PRACTITIONER

## 2024-11-26 PROCEDURE — 2720000007 HC OR 272 NO HCPCS

## 2024-11-26 PROCEDURE — 99203 OFFICE O/P NEW LOW 30 MIN: CPT | Performed by: NURSE PRACTITIONER

## 2024-11-26 PROCEDURE — 3077F SYST BP >= 140 MM HG: CPT | Performed by: NURSE PRACTITIONER

## 2024-11-26 PROCEDURE — 77065 DX MAMMO INCL CAD UNI: CPT | Mod: RIGHT SIDE | Performed by: RADIOLOGY

## 2024-11-26 PROCEDURE — 3079F DIAST BP 80-89 MM HG: CPT | Performed by: NURSE PRACTITIONER

## 2024-11-26 PROCEDURE — 99213 OFFICE O/P EST LOW 20 MIN: CPT | Performed by: NURSE PRACTITIONER

## 2024-11-26 PROCEDURE — 19083 BX BREAST 1ST LESION US IMAG: CPT | Mod: RIGHT SIDE | Performed by: RADIOLOGY

## 2024-11-26 PROCEDURE — A4648 IMPLANTABLE TISSUE MARKER: HCPCS

## 2024-11-26 PROCEDURE — C1819 TISSUE LOCALIZATION-EXCISION: HCPCS

## 2024-11-26 PROCEDURE — 2500000004 HC RX 250 GENERAL PHARMACY W/ HCPCS (ALT 636 FOR OP/ED): Performed by: RADIOLOGY

## 2024-11-26 PROCEDURE — 19083 BX BREAST 1ST LESION US IMAG: CPT | Mod: RT

## 2024-11-26 ASSESSMENT — PAIN SCALES - GENERAL
PAINLEVEL_OUTOF10: 0 - NO PAIN
PAINLEVEL_OUTOF10: 0 - NO PAIN
PAINLEVEL_OUTOF10: 0-NO PAIN

## 2024-11-26 ASSESSMENT — PAIN - FUNCTIONAL ASSESSMENT: PAIN_FUNCTIONAL_ASSESSMENT: 0-10

## 2024-11-26 ASSESSMENT — PATIENT HEALTH QUESTIONNAIRE - PHQ9
1. LITTLE INTEREST OR PLEASURE IN DOING THINGS: NOT AT ALL
SUM OF ALL RESPONSES TO PHQ9 QUESTIONS 1 & 2: 0
2. FEELING DOWN, DEPRESSED OR HOPELESS: NOT AT ALL

## 2024-11-27 ENCOUNTER — TELEPHONE (OUTPATIENT)
Dept: RADIOLOGY | Facility: HOSPITAL | Age: 64
End: 2024-11-27
Payer: COMMERCIAL

## 2024-11-27 NOTE — TELEPHONE ENCOUNTER
Right breast bx on 11/26/24:  called post biopsy and patient reports that she is doing fine, no problems, no bruising or bleeding.  Waiting for pathology results.

## 2024-11-29 PROBLEM — N63.11 MASS OF UPPER OUTER QUADRANT OF RIGHT BREAST: Status: ACTIVE | Noted: 2024-11-29

## 2024-11-29 PROBLEM — Z01.818 PRE-PROCEDURAL EXAMINATION: Status: ACTIVE | Noted: 2024-11-29

## 2024-11-29 NOTE — PATIENT INSTRUCTIONS
It was nice meeting you today, Fiona.  I will call you in about 5-10 days with the results of your biopsy.  We discussed that your results will be reports to your MyChart immediately. You can choose to look at them knowing that I will be calling though it may not be until later in the day, or, you can wait for my call and we can review them together.   Please call with any concerns at [phone].

## 2024-11-29 NOTE — PROGRESS NOTES
Alisia Eisenberg female   1960 64 y.o.   [id number]      Chief Complaint    New Patient Visit          HPI  Alisia Eisenberg is a 64 y.o.  female referred by Radiology to the [facility] for pre-biopsy H & P    BREAST IMAGING:   Status Exam Begun Exam Ended   Final 11/13/2024 10:26 11/13/2024 10:39     Study Result    Narrative & Impression   Interpreted By:  Odette Jha,   STUDY:  BI MAMMO RIGHT DIAGNOSTIC TOMOSYNTHESIS; BI US BREAST LIMITED RIGHT;  11/13/2024 10:39 am; 11/13/2024 10:56 am      ACCESSION NUMBER(S):  [id number]; [id number]      ORDERING CLINICIAN:  ADAMARIS RALPH      INDICATION:  Callback from screening mammography for a focal asymmetry in the  right breast. Family history of breast cancer.      ,R92.8 Other abnormal and inconclusive findings on diagnostic imaging  of breast      COMPARISON:  11/04/2024 and all relevant prior breast imaging exams available at  the time of dictation.      FINDINGS:  MAMMOGRAPHY: 2D and tomosynthesis images were reviewed at 1 mm slice  thickness.      Density:  There are scattered areas of fibroglandular density.      A fat containing focal asymmetry persist in central lateral right  breast at middle depth corresponding to the finding on screening  mammogram.      ULTRASOUND: Targeted ultrasound with elastography was performed by a  registered sonographer in the right breast and right axilla. There is  an irregular hypoechoic mass with indistinct margins and echogenic  rind with posterior shadowing in anti parallel orientation at 9  o'clock, 5 cm from the nipple measuring 0.6 x 0.8 x 0.7 cm. The mass  has internal vascularity and is stiff on elastography. This  corresponds with the focal asymmetry on mammogram.      Scanning of the right axilla demonstrates 3 morphologically normal  lymph nodes with preserved fatty hilum and normal cortical thickness.      IMPRESSION:  Suspicious right breast mass without axillary  lymphadenopathy.  Further evaluation with surgical consultation and ultrasound-guided  biopsy is recommended.      Dr. Odette Jha discussed the findings and recommendations with  the patient at the time of exam. A message was sent to the referring  clinician at the time of this dictation regarding these findings  using the Epic critical findings reporting system. A pre-procedure  form was filled out.      Method of Detection: Category Sdbt - 3D Screening      BI-RADS CATEGORY:  BI-RADS Category:  4 Suspicious.  Recommendation:  Surgical Consultation and Biopsy.  Recommended Date:  Immediate.  Laterality:  Right.       REPRODUCTIVE HISTORY: menarche age 16  menopause age 52 fatty/scattered/heterogeneously/extremely dense breast tissue     FAMILY CANCER HISTORY:   Mother breast cancer with recurrence.  Sister breast cancer age 30 x2 and colon cancer. Genetic negative.  See below for further details/family members    MEDICAL HISTORY:  HTN  Vitamin D deficiency  Osteopenia    SURGICAL HISTORY:  None    SOCIAL HISTORY:  Never smoked  Rare alcohol use  Denies smokeless tobacco  Does not use drugs    Has 3 adult children    Enjoys cooking, running, reading      REVIEW OF SYSTEMS:  Alisia denies any unusual headaches, balance issues, depression, cough, shortness of breath, problems swallowing, changes in chest/breast area, abdominal pain, bone or muscle pain, vaginal bleeding, rectal bleeding, blood in the urine, vaginal dryness, swelling arms or legs, new or unusual skin moles or lesions.         MEDICATIONS  Current Outpatient Medications   Medication Instructions    cholecalciferol (Vitamin D-3) 50 mcg (2,000 unit) capsule 1 capsule, Daily    lisinopril 20 mg, oral, Daily        ALLERGIES  Allergies   Allergen Reactions    Amoxicillin Diarrhea, Nausea Only and Other     Receives instant yeast inf        Patient Active Problem List    Diagnosis Date Noted    Well adult exam 08/11/2023     Benign essential hypertension 03/13/2023    Borderline hyperlipidemia 03/13/2023    Breast density 03/13/2023    Diverticulosis 03/13/2023    External hemorrhoids 03/13/2023    Impacted cerumen of right ear 03/13/2023    Osteopenia 03/13/2023    Other microscopic hematuria 03/13/2023    Vitamin D deficiency 03/13/2023    WBC decreased 03/13/2023     Past Medical History:   Diagnosis Date    Hypertension     Personal history of other mental and behavioral disorders 12/04/2014    History of depression      Past Surgical History:   Procedure Laterality Date    OTHER SURGICAL HISTORY  01/21/2016    Prior Surgical Procedure Not Done      Family History   Problem Relation Name Age of Onset    Arthritis Mother Johanna Andersen     Cancer Mother Johanna Andersen     Diabetes Mother Johanna Andersen     Hearing loss Mother Johanna Andersen     Hypertension Mother Johanna Andersen     Breast cancer Mother Johanna Andersen     Other (Adenocarcinoma in situ of cervix) Mother Johanna Andersen     Cancer Sister Erin yosonam     Depression Sister Erin yosonam     Hypertension Sister Erin paige     Breast cancer Sister Erin yosonam     Colon cancer Sister Erin paige     BRCA1 Negative Sister Erin yosonam     Rectal cancer Sister Erin yosonam     Depression Daughter Nhi Eisenberg     Birth defects Son Richard Eisenberg     Arthritis Mother's Sister Lorena Osullivan     Arthritis Maternal Grandmother Lydia Bertolini     Cancer Maternal Grandmother Lydia Bertolini     Hypertension Maternal Grandmother Lydia Bertolini     Colon cancer Maternal Grandmother Lydia Bertolini     Hypertension Paternal Grandmother Mounika Andersen     Colon cancer Other Grandmother     Colon cancer Other Grandfather           SOCIAL HISTORY      Social History     Tobacco Use    Smoking status: Never    Smokeless tobacco: Never   Substance Use Topics    Alcohol use: Yes     Alcohol/week: 3.0 standard drinks of alcohol     Types: 2 Glasses of wine, 1 Standard drinks or  equivalent per week        VITALS  Vitals:    11/26/24 1137   BP: 153/82   Pulse: 64   Resp: 18   Temp: 36.6 °C (97.9 °F)   SpO2: 96%        PHYSICAL EXAM  Constitutional: Well developed, alert/oriented x3, no distress, cooperative   Eyes: clear sclera   ENMT: mucous membranes moist, no apparent lesions   Head/Neck: Neck supple, no bruits   Respiratory/Thorax: Patent airways, normal breath sounds with good chest expansion   Cardiovascular: Regular rate and rhythm, no murmurs, 2+ equal pulses of the extremities,   Gastrointestinal: Nondistended, soft, non-tender, no masses palpable, no organomegaly   Musculoskeletal: ROM intact, no joint swelling, normal strength   Extremities: normal extremities, no edema, cyanosis, contusions or wounds   Neurological: alert and oriented x3,  normal strength   Breast:     Lymphatic: No significant lymphadenopathy   Psychological: Appropriate mood and behavior   Skin: Warm and dry, no lesions, no rashes       Physical Exam  Chest:          Comments: Right breast without masses, nodules, skin changes, discharge. Area in question is not palpable. Left breast without masses, nodules, skin changes, discharge.         ORDERS  Orders signed for right ultrasound guided biopsy.         ASSESSMENT/PLAN  Alisia is a 65 yo woman who presents for a right ultrasound guided biopsy for a right central/lateral mass found on routine screening.     Plan:  Alisia may proceed with biopsy today.  I will call in about 5-10 days with the results.   We reviewed that due to government regulations that her pathology results will be reported to her MyChart right away. She can choose to read them knowing that I will be calling though it may not be until the end of the day, or, she can wait for my call to discuss the results.  All of Alisia's questions/concerns were addressed.  Over 15 minutes of time was spent with this patient with >50% of the time with education, counseling, and coordination of  care.       No follow-ups on file.      Nel Mcelroy, [facility]

## 2024-12-03 LAB
LAB AP ASR DISCLAIMER: NORMAL
LABORATORY COMMENT REPORT: NORMAL
PATH REPORT.ADDENDUM SPEC: NORMAL
PATH REPORT.FINAL DX SPEC: NORMAL
PATH REPORT.GROSS SPEC: NORMAL
PATH REPORT.RELEVANT HX SPEC: NORMAL
PATH REPORT.TOTAL CANCER: NORMAL

## 2024-12-08 PROBLEM — D05.11 DUCTAL CARCINOMA IN SITU (DCIS) OF RIGHT BREAST: Status: ACTIVE | Noted: 2024-12-08

## 2024-12-08 ASSESSMENT — ENCOUNTER SYMPTOMS
NEUROLOGICAL NEGATIVE: 1
HEMATOLOGIC/LYMPHATIC NEGATIVE: 1
ENDOCRINE NEGATIVE: 1
PSYCHIATRIC NEGATIVE: 1
CARDIOVASCULAR NEGATIVE: 1
EYES NEGATIVE: 1
GASTROINTESTINAL NEGATIVE: 1
MUSCULOSKELETAL NEGATIVE: 1
CONSTITUTIONAL NEGATIVE: 1
RESPIRATORY NEGATIVE: 1

## 2024-12-08 NOTE — H&P (VIEW-ONLY)
Subjective     Diagnosis date: 24 right breast ductal carcinoma in situ, nuclear grade 1-2, % 3+, cTisN0, stage 0     Cancer Staging   Ductal carcinoma in situ (DCIS) of right breast  Staging form: Breast, AJCC 8th Edition  - Clinical stage from 2024: Stage 0 - Signed by Leticia Sanchez MD on 2024  cTis (DCIS)  cN0  cM0  ER Status: Positive  MT Status: Not assessed  HER2 Status: Not assessed        Alisia Eisenberg is a 64 y.o. female who was referred by: Dr. Sindhu Aguilar  for evaluation of a screen detected right breast DCIS. She presents to the [facility] for treatment recommendations. She proceeded to have diagnostic imaging demonstrating at 9 o'clock position there is a 0.8 cm asymmetry with an US correlate. The area underwent US-guided core biopsy revealing a diagnosis of ductal carcinoma in situ. She denies skin changes or nipple discharge. She has no family history of breast or ovarian cancers.    BREAST IMAGIN24 Bilateral screening mammogram demonstrates scattered fibroglandular density, BI-RADS Category 0, focal asymmetry in the right breast at 9:00 mid depth  24 right diagnostic mammogram and targeted ultrasound, indicates BI-RADS Category 4, A fat containing focal asymmetry persist in central lateral right breast at middle depth corresponding to the finding on screening mammogram. On US, there is an irregular hypoechoic mass with indistinct margins and echogenic rind with posterior shadowing in anti parallel orientation at 9 o'clock, 5 cm from the nipple measuring 0.8 cm. The mass has internal vascularity and is stiff on elastography. This corresponds with the focal asymmetry on mammogram. Scanning of the right axilla demonstrates 3 morphologically normal lymph nodes with preserved fatty hilum and normal cortical thickness.    BREAST PROCEDURE: 24 right breast, 9:00 5cm from nipple, ultrasound-guided core biopsy.    REPRODUCTIVE HEALTH:  menarche at 16, post-menopausal at 52, , age at first birth 30, did not breastfeed, with no hormone exposure such as birth control pills or post menopausal estrogen    MEDICAL HISTORY: hypertension    FAMILY CANCER HISTORY:   Mother with breast cancer at 50  Sister with breast cancer at 30 and colon cancer, negative for pathogenic variant    MEDICAL ONCOLOGY: referral post op if indicated    RADIATION ONCOLOGY: referral post op    GENETICS: meets NCCN criteria, will obtain in a non-urgent fashion    Cancer-related family history includes Breast cancer in her mother and sister; Cancer in her maternal grandmother, mother, and sister; Colon cancer in her maternal grandmother, sister, and other family members; Rectal cancer in her sister.    Review of Systems   Constitutional: Negative.    HENT: Negative.     Eyes: Negative.    Respiratory: Negative.     Cardiovascular: Negative.    Gastrointestinal: Negative.    Endocrine: Negative.    Genitourinary: Negative.    Musculoskeletal: Negative.    Skin: Negative.    Neurological: Negative.    Hematological: Negative.    Psychiatric/Behavioral: Negative.          Objective     Visit Vitals  /86 (BP Location: Right arm, Patient Position: Sitting, BP Cuff Size: Small adult)   Pulse 58   Temp 36.6 °C (97.8 °F) (Temporal)   Wt 59 kg (130 lb)   BMI 23.03 kg/m²   OB Status Postmenopausal   Smoking Status Never   BSA 1.62 m²        Breast: Exam performed in sitting and supine positions.   cup size: DD  RIGHT BREAST EXAM:  Breast: no discrete mass  Skin Erythema: no  Attachment of Overlying Skin: no  Peau d' orange: no  Chest Wall Attachment: no  Nipple Inversion: no  Nipple Discharge: no     LEFT BREAST EXAM:  Breast: no discrete mass  Skin Erythema: no  Peau d' orange: no  Nipple Inversion: no  Nipple Discharge: no     RIGHT KANCHAN BASIN EXAM:  Axillary: negative  Infraclavicular: negative  Supraclavicular: negative     LEFT KANCHAN BASIN EXAM:  Axillary:  negative  Infraclavicular: negative  Supraclavicular: negative        General: Otherwise healthy appearing. Patient is in no acute distress.     HEENT: Conjunctivae are well colored and sclerae nonicteric. Neck is supple, trachea midline. No lymphadenopathy or thyromegaly.     Chest: Respiratory rate and effort normal. No cough.     CV: regular rate and rhythm.     Abdomen: Abdomen is non-distended, non-tender to palpation.     Extremities: Extremities are atraumatic. There is no evidence of lymphedema.    Shoulder abduction test: (raising affected arm(s) from lateral to 180 degrees overhead, one at a time) no limitations     Neurologic: Neurologically intact. Alert and oriented.        Imaging    Images from diagnostic mammogram and ultrasound were reviewed with breast imaging and results were shared with Ms. Eisenberg today.    Assessment and Plan    The natural history and evolution of DCIS was discussed with MsClaus FisherFaina and her partner Ed. This included the difference between in-situ and invasive carcinoma, and the distinction between local and systemic disease and local and systemic therapy. For local treatment options, I explained the risks and benefits of breast conservation and mastectomy (with or without reconstruction), including the fact that survival rates are equal with these two approaches, which is essentially 100% with non-invasive disease. If breast conservation is elected, I explained the need for free margins, the possibility of re-excision to achieve free margins, and the possible need for post-operative radiotherapy. The patient was told that rosalio staging is not usually required for DCIS, but can be recommended if mastectomy is planned. The reasons for this were explained. I explained that for pure DCIS, for systemic therapy chemotherapy would never be recommended, although endocrine agents such as tamoxifen can be used in women with hormone sensitive disease in order to reduce the risk of  local recurrence and future new primaries.    Ms. Eisenberg meets NCCN criteria for genetic testing. I discussed the purpose of obtaining this information would be to help us determine the risk of future breast cancers, but it does not tell us anything about the behavior of the cancer she has today. She understands approximately 5-10% of all breast cancers have a genetic component, so the most likely outcome from testing would be negative for a pathogenic variant. Should she be positive and at elevated risk for future breast cancer, we could discuss the value of bilateral mastectomy to address her current breast cancer diagnosis and for risk reduction of future breast cancers. She may be interested in this information in the future, but this does not need to be obtained in an urgent fashion.    The indications, risks, and benefits of MRI evaluation were discussed, including the greater sensitivity of MRI at the cost of a high false positive rate, leading to additional imaging procedures and possible unnecessary biopsy. Also the lack of evidence ie improvement of long or short-term outcomes, and the fact that women undergoing MRI tend to have larger resections, and it is unknown if these are necessary. After this discussion we decided to proceed without breast MRI at this time.    From a surgery standpoint, Ms. Eisenberg is an excellent candidate for lumpectomy. Using individualized shared decision-making, this would be her preferred approach. We discussed the need for Magseed for localization. Referrals will be placed to medical oncology (if indicated) and radiation oncology post op.    Following this discussion, where all of the patient's questions were answered, we agreed to proceed with right Magseed lumpectomy. Informed consent was obtained in clinic today and surgery is tentatively scheduled at [facility] for 12/30/24. Chronic co-morbidities that can increase surgical complications (hypertension) were reviewed and  noted; these will continue to be managed by her PCP and respective specialists.     Leticia Sanchez MD

## 2024-12-08 NOTE — PROGRESS NOTES
Subjective     Diagnosis date: 24 right breast ductal carcinoma in situ, nuclear grade 1-2, % 3+, cTisN0, stage 0     Cancer Staging   Ductal carcinoma in situ (DCIS) of right breast  Staging form: Breast, AJCC 8th Edition  - Clinical stage from 2024: Stage 0 - Signed by Leticia Sanchez MD on 2024  cTis (DCIS)  cN0  cM0  ER Status: Positive  FL Status: Not assessed  HER2 Status: Not assessed        Alisia Eisenberg is a 64 y.o. female who was referred by: Dr. Sindhu Aguilar  for evaluation of a screen detected right breast DCIS. She presents to the [facility] for treatment recommendations. She proceeded to have diagnostic imaging demonstrating at 9 o'clock position there is a 0.8 cm asymmetry with an US correlate. The area underwent US-guided core biopsy revealing a diagnosis of ductal carcinoma in situ. She denies skin changes or nipple discharge. She has no family history of breast or ovarian cancers.    BREAST IMAGIN24 Bilateral screening mammogram demonstrates scattered fibroglandular density, BI-RADS Category 0, focal asymmetry in the right breast at 9:00 mid depth  24 right diagnostic mammogram and targeted ultrasound, indicates BI-RADS Category 4, A fat containing focal asymmetry persist in central lateral right breast at middle depth corresponding to the finding on screening mammogram. On US, there is an irregular hypoechoic mass with indistinct margins and echogenic rind with posterior shadowing in anti parallel orientation at 9 o'clock, 5 cm from the nipple measuring 0.8 cm. The mass has internal vascularity and is stiff on elastography. This corresponds with the focal asymmetry on mammogram. Scanning of the right axilla demonstrates 3 morphologically normal lymph nodes with preserved fatty hilum and normal cortical thickness.    BREAST PROCEDURE: 24 right breast, 9:00 5cm from nipple, ultrasound-guided core biopsy.    REPRODUCTIVE HEALTH:  menarche at 16, post-menopausal at 52, , age at first birth 30, did not breastfeed, with no hormone exposure such as birth control pills or post menopausal estrogen    MEDICAL HISTORY: hypertension    FAMILY CANCER HISTORY:   Mother with breast cancer at 50  Sister with breast cancer at 30 and colon cancer, negative for pathogenic variant    MEDICAL ONCOLOGY: referral post op if indicated    RADIATION ONCOLOGY: referral post op    GENETICS: meets NCCN criteria, will obtain in a non-urgent fashion    Cancer-related family history includes Breast cancer in her mother and sister; Cancer in her maternal grandmother, mother, and sister; Colon cancer in her maternal grandmother, sister, and other family members; Rectal cancer in her sister.    Review of Systems   Constitutional: Negative.    HENT: Negative.     Eyes: Negative.    Respiratory: Negative.     Cardiovascular: Negative.    Gastrointestinal: Negative.    Endocrine: Negative.    Genitourinary: Negative.    Musculoskeletal: Negative.    Skin: Negative.    Neurological: Negative.    Hematological: Negative.    Psychiatric/Behavioral: Negative.          Objective     Visit Vitals  /86 (BP Location: Right arm, Patient Position: Sitting, BP Cuff Size: Small adult)   Pulse 58   Temp 36.6 °C (97.8 °F) (Temporal)   Wt 59 kg (130 lb)   BMI 23.03 kg/m²   OB Status Postmenopausal   Smoking Status Never   BSA 1.62 m²        Breast: Exam performed in sitting and supine positions.   cup size: DD  RIGHT BREAST EXAM:  Breast: no discrete mass  Skin Erythema: no  Attachment of Overlying Skin: no  Peau d' orange: no  Chest Wall Attachment: no  Nipple Inversion: no  Nipple Discharge: no     LEFT BREAST EXAM:  Breast: no discrete mass  Skin Erythema: no  Peau d' orange: no  Nipple Inversion: no  Nipple Discharge: no     RIGHT KANCHAN BASIN EXAM:  Axillary: negative  Infraclavicular: negative  Supraclavicular: negative     LEFT KANCHAN BASIN EXAM:  Axillary:  negative  Infraclavicular: negative  Supraclavicular: negative        General: Otherwise healthy appearing. Patient is in no acute distress.     HEENT: Conjunctivae are well colored and sclerae nonicteric. Neck is supple, trachea midline. No lymphadenopathy or thyromegaly.     Chest: Respiratory rate and effort normal. No cough.     CV: regular rate and rhythm.     Abdomen: Abdomen is non-distended, non-tender to palpation.     Extremities: Extremities are atraumatic. There is no evidence of lymphedema.    Shoulder abduction test: (raising affected arm(s) from lateral to 180 degrees overhead, one at a time) no limitations     Neurologic: Neurologically intact. Alert and oriented.        Imaging    Images from diagnostic mammogram and ultrasound were reviewed with breast imaging and results were shared with Ms. Eisenberg today.    Assessment and Plan    The natural history and evolution of DCIS was discussed with MsClaus FisherFaina and her partner Ed. This included the difference between in-situ and invasive carcinoma, and the distinction between local and systemic disease and local and systemic therapy. For local treatment options, I explained the risks and benefits of breast conservation and mastectomy (with or without reconstruction), including the fact that survival rates are equal with these two approaches, which is essentially 100% with non-invasive disease. If breast conservation is elected, I explained the need for free margins, the possibility of re-excision to achieve free margins, and the possible need for post-operative radiotherapy. The patient was told that rosalio staging is not usually required for DCIS, but can be recommended if mastectomy is planned. The reasons for this were explained. I explained that for pure DCIS, for systemic therapy chemotherapy would never be recommended, although endocrine agents such as tamoxifen can be used in women with hormone sensitive disease in order to reduce the risk of  local recurrence and future new primaries.    Ms. Eisenberg meets NCCN criteria for genetic testing. I discussed the purpose of obtaining this information would be to help us determine the risk of future breast cancers, but it does not tell us anything about the behavior of the cancer she has today. She understands approximately 5-10% of all breast cancers have a genetic component, so the most likely outcome from testing would be negative for a pathogenic variant. Should she be positive and at elevated risk for future breast cancer, we could discuss the value of bilateral mastectomy to address her current breast cancer diagnosis and for risk reduction of future breast cancers. She may be interested in this information in the future, but this does not need to be obtained in an urgent fashion.    The indications, risks, and benefits of MRI evaluation were discussed, including the greater sensitivity of MRI at the cost of a high false positive rate, leading to additional imaging procedures and possible unnecessary biopsy. Also the lack of evidence ie improvement of long or short-term outcomes, and the fact that women undergoing MRI tend to have larger resections, and it is unknown if these are necessary. After this discussion we decided to proceed without breast MRI at this time.    From a surgery standpoint, Ms. Eisenberg is an excellent candidate for lumpectomy. Using individualized shared decision-making, this would be her preferred approach. We discussed the need for Magseed for localization. Referrals will be placed to medical oncology (if indicated) and radiation oncology post op.    Following this discussion, where all of the patient's questions were answered, we agreed to proceed with right Magseed lumpectomy. Informed consent was obtained in clinic today and surgery is tentatively scheduled at [facility] for 12/30/24. Chronic co-morbidities that can increase surgical complications (hypertension) were reviewed and  noted; these will continue to be managed by her PCP and respective specialists.     Leticia Sanchez MD

## 2024-12-10 ENCOUNTER — OFFICE VISIT (OUTPATIENT)
Dept: SURGICAL ONCOLOGY | Facility: CLINIC | Age: 64
End: 2024-12-10
Payer: COMMERCIAL

## 2024-12-10 VITALS
TEMPERATURE: 97.8 F | WEIGHT: 130 LBS | BODY MASS INDEX: 23.03 KG/M2 | HEART RATE: 58 BPM | DIASTOLIC BLOOD PRESSURE: 86 MMHG | SYSTOLIC BLOOD PRESSURE: 134 MMHG

## 2024-12-10 DIAGNOSIS — D05.11 DUCTAL CARCINOMA IN SITU (DCIS) OF RIGHT BREAST: Primary | ICD-10-CM

## 2024-12-10 PROCEDURE — 1036F TOBACCO NON-USER: CPT | Performed by: SURGERY

## 2024-12-10 PROCEDURE — 99215 OFFICE O/P EST HI 40 MIN: CPT | Performed by: SURGERY

## 2024-12-10 PROCEDURE — 99205 OFFICE O/P NEW HI 60 MIN: CPT | Performed by: SURGERY

## 2024-12-10 PROCEDURE — 3075F SYST BP GE 130 - 139MM HG: CPT | Performed by: SURGERY

## 2024-12-10 PROCEDURE — 3079F DIAST BP 80-89 MM HG: CPT | Performed by: SURGERY

## 2024-12-10 RX ORDER — ACETAMINOPHEN 325 MG/1
975 TABLET ORAL ONCE
OUTPATIENT
Start: 2024-12-10 | End: 2024-12-10

## 2024-12-10 RX ORDER — CEFAZOLIN SODIUM 2 G/100ML
2 INJECTION, SOLUTION INTRAVENOUS ONCE
OUTPATIENT
Start: 2024-12-10 | End: 2024-12-10

## 2024-12-10 RX ORDER — CELECOXIB 400 MG/1
400 CAPSULE ORAL ONCE
OUTPATIENT
Start: 2024-12-10 | End: 2024-12-10

## 2024-12-10 ASSESSMENT — PATIENT HEALTH QUESTIONNAIRE - PHQ9
2. FEELING DOWN, DEPRESSED OR HOPELESS: NOT AT ALL
SUM OF ALL RESPONSES TO PHQ9 QUESTIONS 1 & 2: 0
1. LITTLE INTEREST OR PLEASURE IN DOING THINGS: NOT AT ALL

## 2024-12-10 ASSESSMENT — PAIN SCALES - GENERAL: PAINLEVEL_OUTOF10: 0-NO PAIN

## 2024-12-10 NOTE — PATIENT INSTRUCTIONS
SCC portfolio provided. Surgical Booklet reviewed and provided.     Your surgery is scheduled for Monday, December 30,2024 at [facility]. The surgery center will contact you the business day before surgery in late afternoon (between 2:00-5:00pm) to confirm arrival time and check-in instructions.  If you haven't received a call by 6:00pm, please call [phone].                                 -Scheduled surgery times may change and you will be notified if this occurs-check your personal voicemail for any updates.     Before surgery, you will need:  - Non-fasting bloodwork (CBC. CMP).  This may be completed at any outpatient  laboratory. You do not need to schedule an appointment to complete. Walk-ins accepted.  - Magseed placement to right breast. This procedure is completed in the Breast Center by a Breast Radiologist similar to a breast biopsy. You may drive self to appointment. You may eat and drink as normal. Please allow approximately 1 hour for this visit.     The Day Before Surgery:  -Do not eat any food after midnight the night before surgery.    -You are permitted to drink clear liquids (i.e. water, black coffee, tea, clear broth, apple juice) up to 2 hours before your surgery. You may have up to 13.5 ounces of clear fluids.    DIABETICS:  Please check fasting blood sugar upon waking up.  If fasting sugar is <80 mg/dl, please drink 100ml/3oz of apple juice no later than 2 hours prior to surgery.       On The Morning of Surgery:   -Wear comfortable, loose fitting clothing.  Please try to wear a button down or zip front shirt.  -Do not use moisturizers, creams, lotions or perfume.   -All jewelry and valuables should be left at home.   -Prosthetic devices such as contact lenses, hearing aids, dentures, eyelash extensions, hairpins and body piercing must be removed before surgery.     Bring with You:  -Photo ID and insurance card   -Current list of medicines and allergies     Parking and  Arrival:  -Check in at the Main Entrance desk and let them know you are here for surgery.   -You will be directed to the 2nd floor surgical waiting area.

## 2024-12-27 ENCOUNTER — HOSPITAL ENCOUNTER (OUTPATIENT)
Dept: RADIOLOGY | Facility: CLINIC | Age: 64
Discharge: HOME | End: 2024-12-27
Payer: COMMERCIAL

## 2024-12-27 ENCOUNTER — LAB (OUTPATIENT)
Dept: LAB | Facility: LAB | Age: 64
End: 2024-12-27
Payer: COMMERCIAL

## 2024-12-27 DIAGNOSIS — D05.11 DUCTAL CARCINOMA IN SITU (DCIS) OF RIGHT BREAST: ICD-10-CM

## 2024-12-27 DIAGNOSIS — R92.8 OTHER ABNORMAL AND INCONCLUSIVE FINDINGS ON DIAGNOSTIC IMAGING OF BREAST: ICD-10-CM

## 2024-12-27 LAB
ALBUMIN SERPL BCP-MCNC: 4.2 G/DL (ref 3.4–5)
ALP SERPL-CCNC: 97 U/L (ref 33–136)
ALT SERPL W P-5'-P-CCNC: 20 U/L (ref 7–45)
ANION GAP SERPL CALC-SCNC: 8 MMOL/L (ref 10–20)
AST SERPL W P-5'-P-CCNC: 19 U/L (ref 9–39)
BILIRUB SERPL-MCNC: 0.7 MG/DL (ref 0–1.2)
BUN SERPL-MCNC: 21 MG/DL (ref 6–23)
CALCIUM SERPL-MCNC: 9.3 MG/DL (ref 8.6–10.3)
CHLORIDE SERPL-SCNC: 106 MMOL/L (ref 98–107)
CO2 SERPL-SCNC: 31 MMOL/L (ref 21–32)
CREAT SERPL-MCNC: 0.77 MG/DL (ref 0.5–1.05)
EGFRCR SERPLBLD CKD-EPI 2021: 87 ML/MIN/1.73M*2
ERYTHROCYTE [DISTWIDTH] IN BLOOD BY AUTOMATED COUNT: 12.6 % (ref 11.5–14.5)
GLUCOSE SERPL-MCNC: 87 MG/DL (ref 74–99)
HCT VFR BLD AUTO: 40.3 % (ref 36–46)
HGB BLD-MCNC: 13.6 G/DL (ref 12–16)
MCH RBC QN AUTO: 28.6 PG (ref 26–34)
MCHC RBC AUTO-ENTMCNC: 33.7 G/DL (ref 32–36)
MCV RBC AUTO: 85 FL (ref 80–100)
NRBC BLD-RTO: 0 /100 WBCS (ref 0–0)
PLATELET # BLD AUTO: 191 X10*3/UL (ref 150–450)
POTASSIUM SERPL-SCNC: 4.1 MMOL/L (ref 3.5–5.3)
PROT SERPL-MCNC: 6.7 G/DL (ref 6.4–8.2)
RBC # BLD AUTO: 4.76 X10*6/UL (ref 4–5.2)
SODIUM SERPL-SCNC: 141 MMOL/L (ref 136–145)
WBC # BLD AUTO: 5.4 X10*3/UL (ref 4.4–11.3)

## 2024-12-27 PROCEDURE — 80053 COMPREHEN METABOLIC PANEL: CPT

## 2024-12-27 PROCEDURE — 2500000004 HC RX 250 GENERAL PHARMACY W/ HCPCS (ALT 636 FOR OP/ED): Performed by: STUDENT IN AN ORGANIZED HEALTH CARE EDUCATION/TRAINING PROGRAM

## 2024-12-27 PROCEDURE — 85027 COMPLETE CBC AUTOMATED: CPT

## 2024-12-27 PROCEDURE — 2780000003 HC OR 278 NO HCPCS

## 2024-12-27 PROCEDURE — A4648 IMPLANTABLE TISSUE MARKER: HCPCS

## 2024-12-27 PROCEDURE — 19285 PERQ DEV BREAST 1ST US IMAG: CPT | Mod: RT

## 2024-12-27 ASSESSMENT — PAIN SCALES - GENERAL
PAINLEVEL_OUTOF10: 0 - NO PAIN
PAINLEVEL_OUTOF10: 0 - NO PAIN

## 2024-12-30 ENCOUNTER — HOSPITAL ENCOUNTER (OUTPATIENT)
Dept: RADIOLOGY | Facility: CLINIC | Age: 64
Discharge: HOME | End: 2024-12-30
Payer: COMMERCIAL

## 2024-12-30 ENCOUNTER — ANESTHESIA (OUTPATIENT)
Dept: OPERATING ROOM | Facility: HOSPITAL | Age: 64
End: 2024-12-30
Payer: COMMERCIAL

## 2024-12-30 ENCOUNTER — HOSPITAL ENCOUNTER (OUTPATIENT)
Facility: HOSPITAL | Age: 64
Setting detail: OUTPATIENT SURGERY
Discharge: HOME | End: 2024-12-30
Attending: SURGERY | Admitting: SURGERY
Payer: COMMERCIAL

## 2024-12-30 ENCOUNTER — ANESTHESIA EVENT (OUTPATIENT)
Dept: OPERATING ROOM | Facility: HOSPITAL | Age: 64
End: 2024-12-30
Payer: COMMERCIAL

## 2024-12-30 VITALS
HEART RATE: 60 BPM | OXYGEN SATURATION: 98 % | BODY MASS INDEX: 22.81 KG/M2 | DIASTOLIC BLOOD PRESSURE: 86 MMHG | SYSTOLIC BLOOD PRESSURE: 125 MMHG | TEMPERATURE: 97.3 F | WEIGHT: 128.75 LBS | RESPIRATION RATE: 16 BRPM | HEIGHT: 63 IN

## 2024-12-30 DIAGNOSIS — R92.8 OTHER ABNORMAL AND INCONCLUSIVE FINDINGS ON DIAGNOSTIC IMAGING OF BREAST: ICD-10-CM

## 2024-12-30 DIAGNOSIS — D05.11 DUCTAL CARCINOMA IN SITU (DCIS) OF RIGHT BREAST: Primary | ICD-10-CM

## 2024-12-30 PROCEDURE — 2500000005 HC RX 250 GENERAL PHARMACY W/O HCPCS: Performed by: SURGERY

## 2024-12-30 PROCEDURE — 3700000002 HC GENERAL ANESTHESIA TIME - EACH INCREMENTAL 1 MINUTE: Performed by: SURGERY

## 2024-12-30 PROCEDURE — A19301 PR MASTECTOMY, PARTIAL: Performed by: ANESTHESIOLOGY

## 2024-12-30 PROCEDURE — 19301 PARTIAL MASTECTOMY: CPT | Performed by: SURGERY

## 2024-12-30 PROCEDURE — 2500000004 HC RX 250 GENERAL PHARMACY W/ HCPCS (ALT 636 FOR OP/ED): Mod: JZ | Performed by: SURGERY

## 2024-12-30 PROCEDURE — 76098 X-RAY EXAM SURGICAL SPECIMEN: CPT | Mod: RT

## 2024-12-30 PROCEDURE — A19301 PR MASTECTOMY, PARTIAL

## 2024-12-30 PROCEDURE — 2500000001 HC RX 250 WO HCPCS SELF ADMINISTERED DRUGS (ALT 637 FOR MEDICARE OP): Performed by: SURGERY

## 2024-12-30 PROCEDURE — 88307 TISSUE EXAM BY PATHOLOGIST: CPT | Performed by: STUDENT IN AN ORGANIZED HEALTH CARE EDUCATION/TRAINING PROGRAM

## 2024-12-30 PROCEDURE — 2720000007 HC OR 272 NO HCPCS: Performed by: SURGERY

## 2024-12-30 PROCEDURE — 7100000001 HC RECOVERY ROOM TIME - INITIAL BASE CHARGE: Performed by: SURGERY

## 2024-12-30 PROCEDURE — 76098 X-RAY EXAM SURGICAL SPECIMEN: CPT | Mod: RIGHT SIDE | Performed by: STUDENT IN AN ORGANIZED HEALTH CARE EDUCATION/TRAINING PROGRAM

## 2024-12-30 PROCEDURE — 7100000009 HC PHASE TWO TIME - INITIAL BASE CHARGE: Performed by: SURGERY

## 2024-12-30 PROCEDURE — 3700000001 HC GENERAL ANESTHESIA TIME - INITIAL BASE CHARGE: Performed by: SURGERY

## 2024-12-30 PROCEDURE — 76098 X-RAY EXAM SURGICAL SPECIMEN: CPT | Performed by: SURGERY

## 2024-12-30 PROCEDURE — 7100000002 HC RECOVERY ROOM TIME - EACH INCREMENTAL 1 MINUTE: Performed by: SURGERY

## 2024-12-30 PROCEDURE — 88307 TISSUE EXAM BY PATHOLOGIST: CPT | Mod: TC,AHULAB,WESLAB | Performed by: SURGERY

## 2024-12-30 PROCEDURE — 7100000010 HC PHASE TWO TIME - EACH INCREMENTAL 1 MINUTE: Performed by: SURGERY

## 2024-12-30 PROCEDURE — 3600000004 HC OR TIME - INITIAL BASE CHARGE - PROCEDURE LEVEL FOUR: Performed by: SURGERY

## 2024-12-30 PROCEDURE — 88341 IMHCHEM/IMCYTCHM EA ADD ANTB: CPT | Performed by: STUDENT IN AN ORGANIZED HEALTH CARE EDUCATION/TRAINING PROGRAM

## 2024-12-30 PROCEDURE — 2500000004 HC RX 250 GENERAL PHARMACY W/ HCPCS (ALT 636 FOR OP/ED)

## 2024-12-30 PROCEDURE — 3600000009 HC OR TIME - EACH INCREMENTAL 1 MINUTE - PROCEDURE LEVEL FOUR: Performed by: SURGERY

## 2024-12-30 PROCEDURE — 88342 IMHCHEM/IMCYTCHM 1ST ANTB: CPT | Performed by: STUDENT IN AN ORGANIZED HEALTH CARE EDUCATION/TRAINING PROGRAM

## 2024-12-30 RX ORDER — LIDOCAINE HYDROCHLORIDE 10 MG/ML
0.1 INJECTION, SOLUTION EPIDURAL; INFILTRATION; INTRACAUDAL; PERINEURAL ONCE
Status: DISCONTINUED | OUTPATIENT
Start: 2024-12-30 | End: 2024-12-30 | Stop reason: HOSPADM

## 2024-12-30 RX ORDER — CEFAZOLIN 1 G/1
INJECTION, POWDER, FOR SOLUTION INTRAVENOUS AS NEEDED
Status: DISCONTINUED | OUTPATIENT
Start: 2024-12-30 | End: 2024-12-30

## 2024-12-30 RX ORDER — HYDRALAZINE HYDROCHLORIDE 20 MG/ML
5 INJECTION INTRAMUSCULAR; INTRAVENOUS EVERY 30 MIN PRN
Status: DISCONTINUED | OUTPATIENT
Start: 2024-12-30 | End: 2024-12-30 | Stop reason: HOSPADM

## 2024-12-30 RX ORDER — PHENYLEPHRINE HCL IN 0.9% NACL 1 MG/10 ML
SYRINGE (ML) INTRAVENOUS AS NEEDED
Status: DISCONTINUED | OUTPATIENT
Start: 2024-12-30 | End: 2024-12-30

## 2024-12-30 RX ORDER — ONDANSETRON HYDROCHLORIDE 2 MG/ML
4 INJECTION, SOLUTION INTRAVENOUS ONCE AS NEEDED
Status: DISCONTINUED | OUTPATIENT
Start: 2024-12-30 | End: 2024-12-30 | Stop reason: HOSPADM

## 2024-12-30 RX ORDER — ALBUTEROL SULFATE 0.83 MG/ML
2.5 SOLUTION RESPIRATORY (INHALATION) ONCE AS NEEDED
Status: DISCONTINUED | OUTPATIENT
Start: 2024-12-30 | End: 2024-12-30 | Stop reason: HOSPADM

## 2024-12-30 RX ORDER — MIDAZOLAM HYDROCHLORIDE 1 MG/ML
INJECTION INTRAMUSCULAR; INTRAVENOUS AS NEEDED
Status: DISCONTINUED | OUTPATIENT
Start: 2024-12-30 | End: 2024-12-30

## 2024-12-30 RX ORDER — PROPOFOL 10 MG/ML
INJECTION, EMULSION INTRAVENOUS AS NEEDED
Status: DISCONTINUED | OUTPATIENT
Start: 2024-12-30 | End: 2024-12-30

## 2024-12-30 RX ORDER — OXYCODONE HYDROCHLORIDE 5 MG/1
5 TABLET ORAL EVERY 4 HOURS PRN
Status: DISCONTINUED | OUTPATIENT
Start: 2024-12-30 | End: 2024-12-30 | Stop reason: HOSPADM

## 2024-12-30 RX ORDER — PSYLLIUM HUSK 0.4 G
1 CAPSULE ORAL DAILY
COMMUNITY

## 2024-12-30 RX ORDER — LABETALOL HYDROCHLORIDE 5 MG/ML
5 INJECTION, SOLUTION INTRAVENOUS ONCE AS NEEDED
Status: DISCONTINUED | OUTPATIENT
Start: 2024-12-30 | End: 2024-12-30 | Stop reason: HOSPADM

## 2024-12-30 RX ORDER — TRAMADOL HYDROCHLORIDE 50 MG/1
50 TABLET ORAL EVERY 6 HOURS PRN
Qty: 10 TABLET | Refills: 0 | Status: SHIPPED | OUTPATIENT
Start: 2024-12-30

## 2024-12-30 RX ORDER — GLYCOPYRROLATE 0.2 MG/ML
INJECTION INTRAMUSCULAR; INTRAVENOUS AS NEEDED
Status: DISCONTINUED | OUTPATIENT
Start: 2024-12-30 | End: 2024-12-30

## 2024-12-30 RX ORDER — SODIUM CHLORIDE, SODIUM LACTATE, POTASSIUM CHLORIDE, CALCIUM CHLORIDE 600; 310; 30; 20 MG/100ML; MG/100ML; MG/100ML; MG/100ML
100 INJECTION, SOLUTION INTRAVENOUS CONTINUOUS
Status: DISCONTINUED | OUTPATIENT
Start: 2024-12-30 | End: 2024-12-30 | Stop reason: HOSPADM

## 2024-12-30 RX ORDER — SODIUM CHLORIDE 0.9 G/100ML
IRRIGANT IRRIGATION AS NEEDED
Status: DISCONTINUED | OUTPATIENT
Start: 2024-12-30 | End: 2024-12-30 | Stop reason: HOSPADM

## 2024-12-30 RX ORDER — CELECOXIB 200 MG/1
400 CAPSULE ORAL ONCE
Status: COMPLETED | OUTPATIENT
Start: 2024-12-30 | End: 2024-12-30

## 2024-12-30 RX ORDER — FENTANYL CITRATE 50 UG/ML
INJECTION, SOLUTION INTRAMUSCULAR; INTRAVENOUS AS NEEDED
Status: DISCONTINUED | OUTPATIENT
Start: 2024-12-30 | End: 2024-12-30

## 2024-12-30 RX ORDER — BUPIVACAINE HYDROCHLORIDE 5 MG/ML
INJECTION, SOLUTION EPIDURAL; INTRACAUDAL AS NEEDED
Status: DISCONTINUED | OUTPATIENT
Start: 2024-12-30 | End: 2024-12-30 | Stop reason: HOSPADM

## 2024-12-30 RX ORDER — ACETAMINOPHEN 325 MG/1
975 TABLET ORAL ONCE
Status: COMPLETED | OUTPATIENT
Start: 2024-12-30 | End: 2024-12-30

## 2024-12-30 RX ORDER — LIDOCAINE HYDROCHLORIDE 20 MG/ML
INJECTION, SOLUTION INFILTRATION; PERINEURAL AS NEEDED
Status: DISCONTINUED | OUTPATIENT
Start: 2024-12-30 | End: 2024-12-30

## 2024-12-30 RX ADMIN — PROPOFOL 50 MG: 10 INJECTION, EMULSION INTRAVENOUS at 13:28

## 2024-12-30 RX ADMIN — PROPOFOL 150 MG: 10 INJECTION, EMULSION INTRAVENOUS at 12:50

## 2024-12-30 RX ADMIN — SODIUM CHLORIDE, POTASSIUM CHLORIDE, SODIUM LACTATE AND CALCIUM CHLORIDE: 600; 310; 30; 20 INJECTION, SOLUTION INTRAVENOUS at 12:45

## 2024-12-30 RX ADMIN — MIDAZOLAM HYDROCHLORIDE 2 MG: 1 INJECTION, SOLUTION INTRAMUSCULAR; INTRAVENOUS at 12:46

## 2024-12-30 RX ADMIN — CEFAZOLIN 2 G: 330 INJECTION, POWDER, FOR SOLUTION INTRAMUSCULAR; INTRAVENOUS at 12:55

## 2024-12-30 RX ADMIN — GLYCOPYRROLATE 0.2 MG: 0.2 INJECTION INTRAMUSCULAR; INTRAVENOUS at 13:09

## 2024-12-30 RX ADMIN — ACETAMINOPHEN 975 MG: 325 TABLET, FILM COATED ORAL at 11:47

## 2024-12-30 RX ADMIN — LIDOCAINE HYDROCHLORIDE 60 MG: 20 INJECTION, SOLUTION INFILTRATION; PERINEURAL at 12:50

## 2024-12-30 RX ADMIN — CELECOXIB 400 MG: 200 CAPSULE ORAL at 11:46

## 2024-12-30 RX ADMIN — FENTANYL CITRATE 25 MCG: 50 INJECTION, SOLUTION INTRAMUSCULAR; INTRAVENOUS at 13:11

## 2024-12-30 RX ADMIN — FENTANYL CITRATE 25 MCG: 50 INJECTION, SOLUTION INTRAMUSCULAR; INTRAVENOUS at 12:50

## 2024-12-30 RX ADMIN — Medication 150 MCG: at 13:09

## 2024-12-30 ASSESSMENT — COLUMBIA-SUICIDE SEVERITY RATING SCALE - C-SSRS
1. IN THE PAST MONTH, HAVE YOU WISHED YOU WERE DEAD OR WISHED YOU COULD GO TO SLEEP AND NOT WAKE UP?: NO
6. HAVE YOU EVER DONE ANYTHING, STARTED TO DO ANYTHING, OR PREPARED TO DO ANYTHING TO END YOUR LIFE?: NO
2. HAVE YOU ACTUALLY HAD ANY THOUGHTS OF KILLING YOURSELF?: NO

## 2024-12-30 ASSESSMENT — PAIN - FUNCTIONAL ASSESSMENT
PAIN_FUNCTIONAL_ASSESSMENT: 0-10
PAIN_FUNCTIONAL_ASSESSMENT: UNABLE TO SELF-REPORT
PAIN_FUNCTIONAL_ASSESSMENT: 0-10
PAIN_FUNCTIONAL_ASSESSMENT: 0-10

## 2024-12-30 ASSESSMENT — PAIN SCALES - GENERAL
PAINLEVEL_OUTOF10: 0 - NO PAIN
PAINLEVEL_OUTOF10: 2
PAINLEVEL_OUTOF10: 2
PAINLEVEL_OUTOF10: 0 - NO PAIN
PAINLEVEL_OUTOF10: 2

## 2024-12-30 NOTE — ANESTHESIA PREPROCEDURE EVALUATION
Patient: Alisia Eisenberg    Procedure Information       Date/Time: 12/30/24 1300    Procedure: Right Breast Magseed Lumpectomy (Right: Breast)    Location: [facility] OR 03 / [facility]    Surgeons: Leticia Sanchez MD          63 yo F hx HTN, breast DCIS.  No prior GA    Relevant Problems   Cardiac   (+) Benign essential hypertension   (+) Borderline hyperlipidemia       Clinical information reviewed:   Tobacco  Allergies  Meds   Med Hx  Surg Hx  OB Status  Fam Hx  Soc   Hx        NPO Detail:  NPO/Void Status  Carbohydrate Drink Given Prior to Surgery? : N  Date of Last Liquid: 12/29/24  Time of Last Liquid: 2130  Date of Last Solid: 12/29/24  Time of Last Solid: 2130  Last Intake Type: Clear fluids (water)  Time of Last Void: 1130         Physical Exam    Airway  Mallampati: II  TM distance: >3 FB  Neck ROM: full     Cardiovascular   Rate: normal     Dental - normal exam     Pulmonary - normal exam     Abdominal            Anesthesia Plan    History of general anesthesia?: no  History of complications of general anesthesia?: unknown/emergency    ASA 2     general     intravenous induction   Postoperative administration of opioids is intended.  Anesthetic plan and risks discussed with patient.

## 2024-12-30 NOTE — ANESTHESIA PROCEDURE NOTES
Airway  Date/Time: 12/30/2024 12:52 PM  Urgency: elective    Airway not difficult    Staffing  Performed: BRISA   Authorized by: Manas Martinez MD    Performed by: BRISA Concepcion  Patient location during procedure: OR    Indications and Patient Condition  Indications for airway management: anesthesia  Spontaneous Ventilation: absent  Sedation level: deep  Preoxygenated: yes  Patient position: sniffing  MILS maintained throughout  Mask difficulty assessment: 1 - vent by mask  Planned trial extubation    Final Airway Details  Final airway type: supraglottic airway      Successful airway: Supraglottic airway: igel.  Size 3     Number of attempts at approach: 1

## 2024-12-30 NOTE — PERIOPERATIVE NURSING NOTE
1445 Assuming care of pt at this time. Bedside report received from outgoing RN.   1450 family at bedside   1458 Discharge instructions provided to pt and family. Educated on medications, effects of anesthesia, and homecoming care. Pt and family verbalizing understanding of all instructions provided at this time. All questions and concerns answered. Pt assisted with dressing with help of family.     1502 IV removed dressing applied. Pt given contact information for provider. Pt meets criteria to discharge from phase 2    1508 Pt assisted to main lobby via  by transport. Dc in stable condition to home. All belongings taken with pt.

## 2024-12-30 NOTE — OP NOTE
Right Breast Magseed Lumpectomy (R) Operative Note     Date: 2024  OR Location: [facility] A OR    Name: Alisia Eisenberg, : 1960, Age: 64 y.o., MRN: [id number], Sex: female    Diagnosis  Pre-op Diagnosis      * Ductal carcinoma in situ (DCIS) of right breast [D05.11] Post-op Diagnosis     * Ductal carcinoma in situ (DCIS) of right breast [D05.11]     Procedures  Right Breast Magseed Lumpectomy  12167 - UT MASTECTOMY PARTIAL      Surgeons      * Leticia Sanchez - Primary    Resident/Fellow/Other Assistant:  Surgeons and Role:     * LINDA Lopez-C - WHITLEY First Assist    Staff:   Hunterulator: Roxy  Scrub Person: Arlet  Scrub Person: Fanny Lee Person: Alexa    Anesthesia Staff: Anesthesiologist: Manas Martinez MD  C-AA: BRISA Concepcion    Procedure Summary  Anesthesia: General  ASA: II  Estimated Blood Loss: 1mL  Intra-op Medications: bupivicaine    Specimen:   ID Type Source Tests Collected by Time   1 : RIGHT MAGSEED LUMPECTOMY AT 0900 5CM FN Tissue BREAST LUMPECTOMY RIGHT SURGICAL PATHOLOGY EXAM Leticia Sanchez MD 2024 1312   2 : RIGHT SUPERIOR MARGIN, CLIP ACUNA TRUE MARGIN Tissue BREAST MARGIN RIGHT SURGICAL PATHOLOGY EXAM Leticia Sanchez MD 2024 1316   3 : RIGHT MEDIAL MARGIN, CLIP ACUNA TRUE MARGIN Tissue BREAST MARGIN RIGHT SURGICAL PATHOLOGY EXAM Leticia Sanchez MD 2024 1317   4 : RIGHT INFERIOR MARGIN, CLIP ACUNA TRUE MARGIN Tissue BREAST MARGIN RIGHT SURGICAL PATHOLOGY EXAM Leticia Sanchez MD 2024 1317   5 : RIGHT LATERAL MARGIN, CLIP ACUNA TRUE MARGIN Tissue BREAST MARGIN RIGHT SURGICAL PATHOLOGY EXAM Leticia Sanchez MD 2024 1317   6 : RIGHT POSTERIOR MARGIN, CLIP ACUNA TRUE MARGIN Tissue BREAST MARGIN RIGHT SURGICAL PATHOLOGY EXAM Leticia Sanchez MD 2024 1317   7 : RIGHT ANTERIOR MARGIN, CLIP ACUNA TRUE MARGIN Tissue BREAST MARGIN RIGHT SURGICAL PATHOLOGY EXAM Leticia Sanchez MD 2024 1317               Findings: specimen radiograph  confirmed removal of Magseed and biopsy clip    Indications: Alisia Eisenberg is an 64 y.o. female who is having surgery for Ductal carcinoma in situ (DCIS) of right breast [D05.11].     The patient was seen in the preoperative area. The risks, benefits, complications, treatment options, non-operative alternatives, expected recovery and outcomes were discussed with the patient. The possibilities of reaction to medication, pulmonary aspiration, injury to surrounding structures, bleeding, recurrent infection, the need for additional procedures, failure to diagnose a condition, and creating a complication requiring transfusion or operation were discussed with the patient. The patient concurred with the proposed plan, giving informed consent.  The site of surgery was properly noted/marked if necessary per policy. The patient has been actively warmed in preoperative area. Preoperative antibiotics have been ordered and given within 1 hours of incision. Venous thrombosis prophylaxis have been ordered including bilateral sequential compression devices    Procedure Details:   After informed consent was obtained and the appropriate breast was marked the patient was transferred to the operating room.  She was positioned on the operating room table in a supine position with her pressure points padded appropriately. A surgical time out was performed with the OR team.  General anesthesia was induced and she received perioperative antibiotics in the form of Ancef.    I reviewed the preoperative imaging and confirmed that she had 1 MagSeed in her right breast at the 9 o'clock position localizing the biopsy clip.  Her right breast and axilla were prepped and draped in a sterile fashion.  Using the probe, I was able to confirm the location of the MagSeed.  I made an inframammary incision. Dissection was taken down through her soft tissues using electrocautery.  A skin flap was raised superiorly and I excised a 2x2 cm square of  "breast tissue that contained the MagSeed.  I confirmed the seed was within the excision specimen using the probe. The specimen was inked on all 6 sides and sent to Pathology fresh labeled \"right Magseed lumpectomy at 9:00 5cm FN.\"    I reviewed the specimen radiograph myself.  I confirmed the excision specimen contained the MagSeed and the biopsy clip.    I then made the decision to take shave margins of the cavity.  I started superiorly and working my way lateral, inferior, medial, anterior, and posterior.  All margins had a clip marking the true margin.  All margins were sent for permanent section.  The posterior margin was all the way down to and included pectoralis fascia.  The cavity was irrigated.  Hemostasis was achieved with electrocautery and clips.  Six small clips were left marking the boundaries of the cavity. The cavity was closed in layers using multiple interrupted 3-0 Vicryls. The skin was closed using interrupted 3-0 Vicryl deep dermals and a running subcuticular 4-0 Monocryl.      20 mL of 0.5 percent Marcaine plain were injected into the surrounding breast tissue for postoperative analgesia.  The incision was dressed with skin glue, fluffs, and a surgical bra.  The patient awoke from general anesthesia without incident.  The instrument and sponge counts were correct at the end of the case.     Complications:  None; patient tolerated the procedure well.    Disposition: PACU - hemodynamically stable.  Condition: stable     Task Performed by RNFA or Surgical Assistant:  Retraction and skin closure      Attending Attestation: A qualified resident physician was not available.    Leticia Sanchez  Phone Number: [phone]      "

## 2024-12-30 NOTE — DISCHARGE INSTRUCTIONS
There are no restrictions after surgery about what you may eat or drink. Please note anesthesia and pain medication can make you feel sick (nauseated).  Please use tylenol 1,000 mg q6h and ibuprofen 400 mg q8h and ice for pain after surgery. If your pain is still >5 after using these OTC medications, please us the Rx medication prescribed for you.  Pain medication and anesthesia can cause constipation. It is important to be physically active (walk) and drink lots of water after surgery. You may need to start an OTC stool softener or laxative if you have not had a bowel movement after 2 days.  You may shower 24h after surgery, but should avoid soaking your incisions in a bathtub/hot tub/swimming pool for 4 weeks.  You have glue over your incisions which functions as a water-tight bandage. This will slowly peel off in approximately 2 weeks.  Please follow the instructions in your Breast Cancer book about exercises to perform and activities to avoid after your surgery.  You may drive when you are not in pain and when you are not taking pain medication, as these both delay reaction time.  I will call you with your pathology report when it is available, usually 2 weeks after surgery.  You will have a post op visit to see me in clinic when we have surgical pathology to discuss. If this has not already been scheduled for you, please call Linda to schedule [phone].  If you have questions or concerns when you are home, please call the office: [place] [phone]

## 2024-12-30 NOTE — ANESTHESIA POSTPROCEDURE EVALUATION
Patient: Alisia Eisenberg    Procedure Summary       Date: 12/30/24 Room / Location: [facility] A OR 03 / Virtual [facility] A OR    Anesthesia Start: 1247 Anesthesia Stop: 1352    Procedure: Right Breast Magseed Lumpectomy (Right: Breast) Diagnosis:       Ductal carcinoma in situ (DCIS) of right breast      (Ductal carcinoma in situ (DCIS) of right breast [D05.11])    Surgeons: Leticia Sanchez MD Responsible Provider: Manas Martinez MD    Anesthesia Type: general ASA Status: 2            Anesthesia Type: general    Vitals Value Taken Time   /85 12/30/24 1416   Temp 36.3 °C (97.3 °F) 12/30/24 1348   Pulse 69 12/30/24 1426   Resp 14 12/30/24 1415   SpO2 95 % 12/30/24 1426   Vitals shown include unfiled device data.    Anesthesia Post Evaluation    Patient participation: complete - patient participated  Level of consciousness: awake  Pain management: satisfactory to patient  Airway patency: patent  Cardiovascular status: acceptable and hemodynamically stable  Respiratory status: acceptable and nonlabored ventilation  Hydration status: balanced  Postoperative Nausea and Vomiting: none        There were no known notable events for this encounter.

## 2024-12-31 ASSESSMENT — PAIN SCALES - GENERAL: PAINLEVEL_OUTOF10: 0 - NO PAIN

## 2025-01-03 ENCOUNTER — TELEPHONE (OUTPATIENT)
Dept: SURGICAL ONCOLOGY | Facility: CLINIC | Age: 65
End: 2025-01-03
Payer: COMMERCIAL

## 2025-01-03 NOTE — TELEPHONE ENCOUNTER
Attempted to contacted patient as routine follow-up from right lumpectomy on 12/30/2024 however received her voicemail. Message left to contact office for questions or concerns.

## 2025-01-13 NOTE — PROGRESS NOTES
Subjective   Alisia Eisenberg is a 64 y.o. Postmenopausal female returns to the [facility] breast center for a post op visit. She has been recovering well since surgery and has no breast specific complaints today.    Diagnosis date: 24 right breast ductal carcinoma in situ, nuclear grade 1-2, % 3+, cTisN0, stage 0     Cancer Staging   Ductal carcinoma in situ (DCIS) of right breast  Staging form: Breast, AJCC 8th Edition  - Clinical stage from 2024: Stage 0 - Signed by Leticia Sanchez MD on 2024  cTis (DCIS)  cN0  cM0  ER Status: Positive  [place] Status: Not assessed  HER2 Status: Not assessed     Alisia Eisenberg presented at the age of 64 with a screen detected right breast DCIS. Diagnostic imaging demonstrated at 9 o'clock position there is a 0.8 cm asymmetry with an US correlate. The area underwent US-guided core biopsy revealing a diagnosis of ductal carcinoma in situ. She denies skin changes or nipple discharge. She has a family history of breast cancer. We proceeded with right Magseed lumpectomy on 24. Final surgical pathology demonstrated ***     BREAST IMAGIN24 Bilateral screening mammogram demonstrates scattered fibroglandular density, BI-RADS Category 0, focal asymmetry in the right breast at 9:00 mid depth  24 right diagnostic mammogram and targeted ultrasound, indicates BI-RADS Category 4, A fat containing focal asymmetry persist in central lateral right breast at middle depth corresponding to the finding on screening mammogram. On US, there is an irregular hypoechoic mass with indistinct margins and echogenic rind with posterior shadowing in anti parallel orientation at 9 o'clock, 5 cm from the nipple measuring 0.8 cm. The mass has internal vascularity and is stiff on elastography. This corresponds with the focal asymmetry on mammogram. Scanning of the right axilla demonstrates 3 morphologically normal lymph nodes with preserved fatty hilum and normal  cortical thickness.     BREAST PROCEDURE: 24 right breast, 9:00 5cm from nipple, ultrasound-guided core biopsy.  24 right Magseed lumpectomy    REPRODUCTIVE HEALTH: menarche at 16, post-menopausal at 52, , age at first birth 30, did not breastfeed, with no hormone exposure such as birth control pills or post menopausal estrogen     MEDICAL HISTORY: hypertension     FAMILY CANCER HISTORY:   Mother with breast cancer at 50  Sister with breast cancer at 30 and colon cancer, negative for pathogenic variant     MEDICAL ONCOLOGY: referral post op if indicated     RADIATION ONCOLOGY: referral post op     GENETICS: meets NCCN criteria, will obtain in a non-urgent fashion    Objective     Physical Exam    right Breast: ***    Assessment/Plan   Stage  0  breast cancer.  Surgery is likely complete, but the final pathology report is still pending. I will contact her when the results are available. Presuming this is DCIS with negative margins, referral radiation oncology has been placed. She would not need a medical oncologist. ***Rick    Return to clinic 6 months after completion of local therapy. Bilateral diagnostic mammogram will be ordered and obtained before the visit.   Leticia Sanchez MD

## 2025-01-14 ENCOUNTER — APPOINTMENT (OUTPATIENT)
Dept: SURGICAL ONCOLOGY | Facility: CLINIC | Age: 65
End: 2025-01-14
Payer: COMMERCIAL

## 2025-01-14 NOTE — PROGRESS NOTES
Subjective   Alisia Eisenberg is a 64 y.o. Postmenopausal female returns to the [facility] breast center for a post op visit. She has been recovering well since surgery and has no breast specific complaints today.    Diagnosis date: 24 right breast ductal carcinoma in situ, nuclear grade 1-2, % 3+, cTisN0, stage 0      Cancer Staging   Ductal carcinoma in situ (DCIS) of right breast  Staging form: Breast, AJCC 8th Edition  - Clinical stage from 2024: Stage 0 - Signed by Leticia Sanchez MD on 2024  cTis (DCIS)  cN0  cM0  ER Status: Positive  OK Status: Not assessed  HER2 Status: Not assessed  - Pathologic stage from 2024: Stage 0 - Signed by Leticia Sanchez MD on 2025  pTis (DCIS)  cN0  cM0  ER Status: Positive  OK Status: Not assessed  HER2 Status: Not assessed     Alisia Eisenberg presented at the age of 64 with a screen detected right breast DCIS. Diagnostic imaging demonstrated at 9 o'clock position there is a 0.8 cm asymmetry with an US correlate. The area underwent US-guided core biopsy revealing a diagnosis of ductal carcinoma in situ. She denies skin changes or nipple discharge. She has a family history of breast cancer. We proceeded with right Magseed lumpectomy on 24. Final surgical pathology demonstrated 1.1cm of grade 1 DCIS with negative margins.     BREAST IMAGIN24 Bilateral screening mammogram demonstrates scattered fibroglandular density, BI-RADS Category 0, focal asymmetry in the right breast at 9:00 mid depth  24 right diagnostic mammogram and targeted ultrasound, indicates BI-RADS Category 4, A fat containing focal asymmetry persist in central lateral right breast at middle depth corresponding to the finding on screening mammogram. On US, there is an irregular hypoechoic mass with indistinct margins and echogenic rind with posterior shadowing in anti parallel orientation at 9 o'clock, 5 cm from the nipple measuring 0.8 cm. The mass has  internal vascularity and is stiff on elastography. This corresponds with the focal asymmetry on mammogram. Scanning of the right axilla demonstrates 3 morphologically normal lymph nodes with preserved fatty hilum and normal cortical thickness.     BREAST PROCEDURE: 24 right breast, 9:00 5cm from nipple, ultrasound-guided core biopsy.  24 right Magseed lumpectomy    REPRODUCTIVE HEALTH: menarche at 16, post-menopausal at 52, , age at first birth 30, did not breastfeed, with no hormone exposure such as birth control pills or post menopausal estrogen     MEDICAL HISTORY: hypertension     FAMILY CANCER HISTORY:   Mother with breast cancer at 50  Sister with breast cancer at 30 and colon cancer, negative for pathogenic variant     MEDICAL ONCOLOGY: not indicated     RADIATION ONCOLOGY: referral to Dr. Nascimento     GENETICS: meets NCCN criteria, scheduled for 25    Objective     Physical Exam    right Breast: healing LOQ incision, good symmetry, no erythema    Assessment/Plan   Stage  0  breast cancer.  Surgery is complete. She was provided with a copy of her pathology report and we reviewed it in detail. Referral has been placed to radiation oncology. She will not need a medical oncologist with DCIS alone.   Return to clinic with my NP in 3 months to discuss the value of endocrine therapy with ER+ DCIS. Bilateral diagnostic mammogram recommended to be obtained at least 6 months after completion of local therapy.   Leticia Sanchez MD

## 2025-01-15 LAB
LAB AP ASR DISCLAIMER: NORMAL
LAB AP BLOCK FOR ADDITIONAL STUDIES: NORMAL
LABORATORY COMMENT REPORT: NORMAL
PATH REPORT.FINAL DX SPEC: NORMAL
PATH REPORT.GROSS SPEC: NORMAL
PATH REPORT.RELEVANT HX SPEC: NORMAL
PATH REPORT.TOTAL CANCER: NORMAL
PATHOLOGY SYNOPTIC REPORT: NORMAL

## 2025-01-16 ENCOUNTER — OFFICE VISIT (OUTPATIENT)
Dept: SURGICAL ONCOLOGY | Facility: HOSPITAL | Age: 65
End: 2025-01-16
Payer: COMMERCIAL

## 2025-01-16 ENCOUNTER — APPOINTMENT (OUTPATIENT)
Dept: SURGICAL ONCOLOGY | Facility: HOSPITAL | Age: 65
End: 2025-01-16
Payer: COMMERCIAL

## 2025-01-16 VITALS
BODY MASS INDEX: 22.89 KG/M2 | TEMPERATURE: 97.5 F | HEART RATE: 60 BPM | DIASTOLIC BLOOD PRESSURE: 94 MMHG | HEIGHT: 63 IN | RESPIRATION RATE: 17 BRPM | WEIGHT: 129.19 LBS | SYSTOLIC BLOOD PRESSURE: 146 MMHG | OXYGEN SATURATION: 96 %

## 2025-01-16 DIAGNOSIS — D05.11 DUCTAL CARCINOMA IN SITU (DCIS) OF RIGHT BREAST: Primary | ICD-10-CM

## 2025-01-16 PROCEDURE — 3080F DIAST BP >= 90 MM HG: CPT | Performed by: SURGERY

## 2025-01-16 PROCEDURE — 3008F BODY MASS INDEX DOCD: CPT | Performed by: SURGERY

## 2025-01-16 PROCEDURE — 99211 OFF/OP EST MAY X REQ PHY/QHP: CPT | Performed by: SURGERY

## 2025-01-16 PROCEDURE — 3077F SYST BP >= 140 MM HG: CPT | Performed by: SURGERY

## 2025-01-16 RX ORDER — LISINOPRIL 30 MG/1
30 TABLET ORAL DAILY
COMMUNITY

## 2025-01-16 ASSESSMENT — PAIN SCALES - GENERAL: PAINLEVEL_OUTOF10: 0-NO PAIN

## 2025-01-27 NOTE — PROGRESS NOTES
Radiation Oncology Outpatient Consult    Patient Name:  Alisia Eisenberg  MRN:  [id number]  :  1960    Referring Provider: Leticia Sanchez MD  Primary Care Provider: No Assigned PCP Generic Provider, MD  Care Team: Patient Care Team:  No Assigned Pcp Generic Provider, MD as PCP - General (General Practice)    Date of Service: 2025     SUBJECTIVE  History of Present Illness:  Alisia Eisenberg is a 64 y.o. female who was referred by Leticia Sanchez MD, for a consultation to the [facility] Department of Radiation Oncology.  She is presenting for evaluation and management of her newly diagnosed ductal carcinoma in situ of the right breast.    Ms. Eisenberg underwent a mammogram on 2024 which revealed an area of focal asymmetry at the 9 o'clock position in the right breast.  Diagnostic views performed 2024 revealed a persistent focal asymmetry in the lateral right breast.  Ultrasound directed at the 9 o'clock position, 5 cm from the nipple revealed a 0.6 x 0.8 x 0.7 cm irregular hypoechoic mass.  Axillary ultrasound revealed 3 normal-appearing lymph nodes.  On 2024 she underwent a right breast biopsy which revealed ductal carcinoma in situ of the solid subtype, nuclear grade 1-2.  Estrogen receptors stained positive at 100%.  On 2024 she underwent a right partial mastectomy.  Pathology revealed a 1.1 cm ductal carcinoma in situ of the solid and papillary subtypes, nuclear grade 1.  The resection margins were negative at greater than 2 mm.  I was asked to see Ms. Eisenberg by Dr. Leticia Sanchez for a discussion regarding the role of radiation in the management of this newly diagnosed ductal carcinoma in situ.    Prior Radiotherapy:  No radiation treatments to show. (Treatments may have been administered in another system.)       Past Medical History:    Past Medical History:   Diagnosis Date    Breast cancer (Multi) 2024    Hypertension 2021     Personal history of other mental and behavioral disorders 2014    History of depression        Past Surgical History:    Past Surgical History:   Procedure Laterality Date    BREAST BIOPSY  2024    BREAST LUMPECTOMY Right 2024    COLONOSCOPY  2022    OTHER SURGICAL HISTORY  2016    Prior Surgical Procedure Not Done        Family History:  Cancer-related family history includes Breast cancer in her mother and sister; Cancer in her maternal grandmother, mother, and sister; Colon cancer in her maternal grandmother, sister, and other family members; Rectal cancer in her sister.    Social History:    Social History     Tobacco Use    Smoking status: Never     Passive exposure: Never    Smokeless tobacco: Never   Vaping Use    Vaping status: Never Used   Substance Use Topics    Alcohol use: Not Currently     Alcohol/week: 3.0 standard drinks of alcohol     Types: 2 Glasses of wine, 1 Standard drinks or equivalent per week    Drug use: Never     Gynecologic History: Menarche age 16.  .  She delivered her first child at the age of 30.  She went through menopause at the age of 52.  She denies any history of oral contraceptive pill usage or hormone replacement therapy.    Allergies:    Allergies   Allergen Reactions    Amoxicillin Diarrhea, Nausea Only and Other     Receives instant yeast inf        Medications:    Current Outpatient Medications:     calcium carbonate-vitamin D3 500 mg-5 mcg (200 unit) tablet, Take 1 tablet by mouth once daily., Disp: , Rfl:     cholecalciferol (Vitamin D-3) 50 mcg (2,000 unit) capsule, Take 1 capsule (50 mcg) by mouth once daily., Disp: , Rfl:     lisinopril 30 mg tablet, Take 1 tablet (30 mg) by mouth once daily., Disp: , Rfl:     lisinopril 20 mg tablet, Take 1 tablet (20 mg) by mouth once daily. (Patient not taking: Reported on 2025), Disp: 90 tablet, Rfl: 0      Review of Systems:  Review of Systems - Oncology.  See nursing note for review of  "systems.    Performance Status:  The Karnofsky performance scale today is 100, Fully active, able to carry on all pre-disease performed without restriction (ECOG equivalent 0).        OBJECTIVE  BP (!) 145/91 (BP Location: Left arm, Patient Position: Sitting, BP Cuff Size: Adult)   Pulse 67   Temp 36.1 °C (97 °F) (Temporal)   Resp 18   Ht 1.6 m (5' 2.99\")   Wt 61.6 kg (135 lb 12.8 oz)   SpO2 100%   BMI 24.06 kg/m²    Physical Exam  Constitutional:       Appearance: Normal appearance.   HENT:      Head: Normocephalic and atraumatic.   Eyes:      Conjunctiva/sclera: Conjunctivae normal.   Cardiovascular:      Heart sounds: Normal heart sounds.   Pulmonary:      Breath sounds: Normal breath sounds.   Chest:   Breasts:     Right: No swelling, bleeding, inverted nipple, mass, nipple discharge, skin change or tenderness.      Left: No swelling, bleeding, inverted nipple, mass, nipple discharge, skin change or tenderness.          Comments: Examination of the right breast reveals a well-healed incision in the lower outer aspect.  There are no suspicious nodules, nipple retraction, or nipple discharge bilaterally.  Abdominal:      Palpations: Abdomen is soft.   Musculoskeletal:      Right lower leg: No edema.      Left lower leg: No edema.   Lymphadenopathy:      Upper Body:      Right upper body: No supraclavicular or axillary adenopathy.      Left upper body: No supraclavicular or axillary adenopathy.   Neurological:      Mental Status: She is alert.          Laboratory Review:  There are no laboratory contraindications to radiation therapy.      Pathology Review:  The pertinent pathology results were reviewed and discussed with the patient.  See history for details.    Imaging:  The pertinent imaging results were reviewed and discussed with the patient.  See history for details.       ASSESSMENT:   Alisia Eisenberg is a 64 y.o. female with Ductal carcinoma in situ (DCIS) of right breast, Clinical: Stage 0 " (cTis (DCIS), cN0, cM0, ER+, KS: Not Assessed, HER2: Not Assessed)  Ductal carcinoma in situ (DCIS) of right breast, Pathologic: Stage 0 (pTis (DCIS), cN0, cM0, ER+, KS: Not Assessed, HER2: Not Assessed).  She is status post right partial mastectomy.     PLAN:   I had a long conversation with Ms. Eisenberg detailing the role of radiation in the management of this low risk ductal carcinoma in situ.  We discussed that radiation would decrease the risk of a local recurrence however would not translate into a survival advantage.  We did talk about whole breast radiation as well as partial breast radiation.  After a thorough discussion of the risks and benefits she has elected to proceed with endocrine therapy alone.  I have asked her to call me with any additional questions or concerns.    NCCN Guidelines were applicable to guide this patients treatment plan.   Dorys Nascimento MD

## 2025-01-28 ENCOUNTER — HOSPITAL ENCOUNTER (OUTPATIENT)
Dept: RADIATION ONCOLOGY | Facility: CLINIC | Age: 65
Setting detail: RADIATION/ONCOLOGY SERIES
Discharge: HOME | End: 2025-01-28
Payer: COMMERCIAL

## 2025-01-28 VITALS
DIASTOLIC BLOOD PRESSURE: 91 MMHG | HEIGHT: 63 IN | RESPIRATION RATE: 18 BRPM | WEIGHT: 135.8 LBS | TEMPERATURE: 97 F | BODY MASS INDEX: 24.06 KG/M2 | SYSTOLIC BLOOD PRESSURE: 145 MMHG | HEART RATE: 67 BPM | OXYGEN SATURATION: 100 %

## 2025-01-28 DIAGNOSIS — D05.11 DUCTAL CARCINOMA IN SITU (DCIS) OF RIGHT BREAST: Primary | ICD-10-CM

## 2025-01-28 PROCEDURE — 99215 OFFICE O/P EST HI 40 MIN: CPT | Performed by: RADIOLOGY

## 2025-01-28 PROCEDURE — 99205 OFFICE O/P NEW HI 60 MIN: CPT | Performed by: RADIOLOGY

## 2025-01-28 ASSESSMENT — ENCOUNTER SYMPTOMS
NEUROLOGICAL NEGATIVE: 1
MUSCULOSKELETAL NEGATIVE: 1
ENDOCRINE NEGATIVE: 1
CARDIOVASCULAR NEGATIVE: 1
HEMATOLOGIC/LYMPHATIC NEGATIVE: 1
GASTROINTESTINAL NEGATIVE: 1
PSYCHIATRIC NEGATIVE: 1
EYES NEGATIVE: 1
CONSTITUTIONAL NEGATIVE: 1
RESPIRATORY NEGATIVE: 1

## 2025-01-28 ASSESSMENT — LIFESTYLE VARIABLES
HOW OFTEN DO YOU HAVE SIX OR MORE DRINKS ON ONE OCCASION: NEVER
HOW OFTEN DO YOU HAVE A DRINK CONTAINING ALCOHOL: 2-4 TIMES A MONTH
HOW MANY STANDARD DRINKS CONTAINING ALCOHOL DO YOU HAVE ON A TYPICAL DAY: 1 OR 2
AUDIT-C TOTAL SCORE: 2
SKIP TO QUESTIONS 9-10: 1

## 2025-01-28 ASSESSMENT — PAIN SCALES - GENERAL: PAINLEVEL_OUTOF10: 0-NO PAIN

## 2025-01-28 ASSESSMENT — COLUMBIA-SUICIDE SEVERITY RATING SCALE - C-SSRS
6. HAVE YOU EVER DONE ANYTHING, STARTED TO DO ANYTHING, OR PREPARED TO DO ANYTHING TO END YOUR LIFE?: NO
1. IN THE PAST MONTH, HAVE YOU WISHED YOU WERE DEAD OR WISHED YOU COULD GO TO SLEEP AND NOT WAKE UP?: NO
2. HAVE YOU ACTUALLY HAD ANY THOUGHTS OF KILLING YOURSELF?: NO

## 2025-01-28 ASSESSMENT — PATIENT HEALTH QUESTIONNAIRE - PHQ9
SUM OF ALL RESPONSES TO PHQ9 QUESTIONS 1 AND 2: 0
2. FEELING DOWN, DEPRESSED OR HOPELESS: NOT AT ALL
1. LITTLE INTEREST OR PLEASURE IN DOING THINGS: NOT AT ALL

## 2025-01-28 NOTE — PROGRESS NOTES
Radiation Oncology Nursing Note    Prior Radiotherapy:  No  No radiation treatments to show. (Treatments may have been administered in another system.)     Current Systemic Treatment:  No     Presence of Pacemaker or ICD:  No    History of Autoimmune or Connective Tissue Disorders:  No    Pain: The patient's current pain level was assessed.  They report currently having a pain of 0 out of 10.  They feel their pain is under control without the use of pain medications.    Review of Systems:  Review of Systems   Constitutional: Negative.    HENT:  Negative.     Eyes: Negative.    Respiratory: Negative.     Cardiovascular: Negative.    Gastrointestinal: Negative.    Endocrine: Negative.    Genitourinary: Negative.     Musculoskeletal: Negative.    Skin: Negative.    Neurological: Negative.    Hematological: Negative.    Psychiatric/Behavioral: Negative.

## 2025-02-27 ENCOUNTER — APPOINTMENT (OUTPATIENT)
Facility: CLINIC | Age: 65
End: 2025-02-27
Payer: COMMERCIAL

## 2025-03-25 ENCOUNTER — OFFICE VISIT (OUTPATIENT)
Dept: URGENT CARE | Age: 65
End: 2025-03-25
Payer: COMMERCIAL

## 2025-03-25 DIAGNOSIS — J01.00 ACUTE NON-RECURRENT MAXILLARY SINUSITIS: Primary | ICD-10-CM

## 2025-03-25 DIAGNOSIS — R05.1 ACUTE COUGH: ICD-10-CM

## 2025-03-25 PROCEDURE — 99203 OFFICE O/P NEW LOW 30 MIN: CPT | Performed by: NURSE PRACTITIONER

## 2025-03-25 RX ORDER — BROMPHENIRAMINE MALEATE, PSEUDOEPHEDRINE HYDROCHLORIDE, AND DEXTROMETHORPHAN HYDROBROMIDE 2; 30; 10 MG/5ML; MG/5ML; MG/5ML
5 SYRUP ORAL 4 TIMES DAILY PRN
Qty: 120 ML | Refills: 0 | Status: SHIPPED | OUTPATIENT
Start: 2025-03-25 | End: 2025-04-04

## 2025-03-25 RX ORDER — DOXYCYCLINE HYCLATE 100 MG
100 TABLET ORAL 2 TIMES DAILY
Qty: 20 TABLET | Refills: 0 | Status: SHIPPED | OUTPATIENT
Start: 2025-03-25 | End: 2025-04-04

## 2025-03-25 NOTE — PROGRESS NOTES
Urgent Care Virtual Video Visit    Patient Location:  work  Provider Location: [facility]     64-year-old female seen via virtual visit for complaint of nasal congestion, sinus pain, sinus pressure, yellow nasal drainage, cough, low-grade fever, generally not feeling well for the past 1 week.  Patient reports she feels like she has a sinus infection.  States under her eyes and her face feels swollen to her.  Given longevity of symptoms we will treat patient for sinusitis with prescription Doxy given PCN allergy and Bromfed.  Patient advised symptomatic/supportive care with increase oral fluids, OTC medications, rest.  Advised to follow-up with her PCP.    Video visit completed with realtime synchronous video/audio connection. Informed consent was obtained from the patient. Patient was made aware that my evaluation and diagnosis are limited due to the fact that we are not in the same room during the interview and that this is a virtual encounter that took place via videoconferencing. Patient verbalized understanding.     Patient disposition: Home    Electronically signed by GRANT Chavez  10:21 AM

## 2025-04-30 ENCOUNTER — APPOINTMENT (OUTPATIENT)
Dept: SURGICAL ONCOLOGY | Facility: CLINIC | Age: 65
End: 2025-04-30
Payer: COMMERCIAL

## 2025-05-07 ENCOUNTER — OFFICE VISIT (OUTPATIENT)
Dept: SURGICAL ONCOLOGY | Facility: CLINIC | Age: 65
End: 2025-05-07
Payer: COMMERCIAL

## 2025-05-07 VITALS
TEMPERATURE: 98 F | HEART RATE: 56 BPM | WEIGHT: 135 LBS | OXYGEN SATURATION: 99 % | SYSTOLIC BLOOD PRESSURE: 142 MMHG | DIASTOLIC BLOOD PRESSURE: 93 MMHG | BODY MASS INDEX: 23.92 KG/M2

## 2025-05-07 DIAGNOSIS — Z85.3 ENCOUNTER FOR FOLLOW-UP SURVEILLANCE OF BREAST CANCER: Primary | ICD-10-CM

## 2025-05-07 DIAGNOSIS — Z08 ENCOUNTER FOR FOLLOW-UP SURVEILLANCE OF BREAST CANCER: Primary | ICD-10-CM

## 2025-05-07 PROCEDURE — 99214 OFFICE O/P EST MOD 30 MIN: CPT | Performed by: NURSE PRACTITIONER

## 2025-05-07 PROCEDURE — 3077F SYST BP >= 140 MM HG: CPT | Performed by: NURSE PRACTITIONER

## 2025-05-07 PROCEDURE — 1036F TOBACCO NON-USER: CPT | Performed by: NURSE PRACTITIONER

## 2025-05-07 PROCEDURE — 3080F DIAST BP >= 90 MM HG: CPT | Performed by: NURSE PRACTITIONER

## 2025-05-07 RX ORDER — TAMOXIFEN CITRATE 10 MG/1
10 TABLET ORAL DAILY
Qty: 90 TABLET | Refills: 3 | Status: SHIPPED | OUTPATIENT
Start: 2025-05-07 | End: 2026-05-02

## 2025-05-07 ASSESSMENT — ENCOUNTER SYMPTOMS
HEMATOLOGIC/LYMPHATIC NEGATIVE: 1
ENDOCRINE NEGATIVE: 1
NEUROLOGICAL NEGATIVE: 1
RESPIRATORY NEGATIVE: 1
CONSTITUTIONAL NEGATIVE: 1
PSYCHIATRIC NEGATIVE: 1
MUSCULOSKELETAL NEGATIVE: 1
EYES NEGATIVE: 1
GASTROINTESTINAL NEGATIVE: 1

## 2025-05-07 ASSESSMENT — PAIN SCALES - GENERAL: PAINLEVEL_OUTOF10: 0-NO PAIN

## 2025-05-07 NOTE — PROGRESS NOTES
[facility]  Alisia Eisenberg female   1960 64 y.o.   [id number]      Chief Complaint  Follow up endocrine therapy discussion, history     History Of Present Illness  Alisia Eisenberg is a pleasant 64 y.o. female s/p right breast partial mastectomy on 2024 with Dr. 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 She met with radiation oncology and decided not to undergo radiation therapy. She has family history of breast cancer in her mother and sister. She presents today to discuss endocrine therapy. She would like the referral to genetics.     REPRODUCTIVE HISTORY:  menarche age 16, , first birth age 30, did not breastfeed, no OCP's, natural menopause age 53, no HRT, scattered fibroglandular tissue    FAMILY CANCER HISTORY:   Mother: Breast cancer  Sister: Breast cancer (BRCA negative)    Review of Systems  Review of Systems   Constitutional: Negative.    HENT:  Negative.     Eyes: Negative.    Respiratory: Negative.     Gastrointestinal: Negative.    Endocrine: Negative.    Genitourinary: Negative.     Musculoskeletal: Negative.    Skin: Negative.    Neurological: Negative.    Hematological: Negative.    Psychiatric/Behavioral: Negative.         Past Medical History  She has a past medical history of Breast cancer (2024), Hypertension (2021), and Personal history of other mental and behavioral disorders (2014).    Surgical History  She has a past surgical history that includes Other surgical history (2016); Colonoscopy (2022); Breast lumpectomy (Right, 2024); and Breast biopsy (2024).    Family History  Cancer-related family history includes Breast cancer in her mother and sister; Cancer in her maternal grandmother and sister; Colon cancer in her maternal grandmother, sister, and other family members; Rectal cancer in her sister.     Social History  She reports that she has never smoked. She has never been  exposed to tobacco smoke. She has never used smokeless tobacco. She reports that she does not currently use alcohol after a past usage of about 3.0 standard drinks of alcohol per week. She reports that she does not use drugs.    Allergies  Amoxicillin    Medications  Current Outpatient Medications   Medication Instructions    calcium carbonate-vitamin D3 500 mg-5 mcg (200 unit) tablet 1 tablet, Daily    cholecalciferol (Vitamin D-3) 50 mcg (2,000 unit) capsule 1 capsule, Daily    lisinopril 20 mg, oral, Daily    lisinopril 30 mg, Daily    tamoxifen (NOLVADEX) 10 mg, oral, Daily, Take with water or any other nonalcoholic drink with or without food at around the same time(s) every day.       Last Recorded Vitals  Vitals:    05/07/25 0849   BP: (!) 142/93   Pulse: 56   Temp: 36.7 °C (98 °F)   SpO2: 99%        Physical Exam  Patient is alert and oriented x3 and in a relaxed and appropriate mood. Her gait is steady and hand grasps are equal. Sclera is clear. The breasts are nearly symmetrical. Right breast has a well healed partial mastectomy incision. The tissue is soft without palpable abnormalities, discrete nodules or masses. The skin and nipples appear normal. There is no cervical, supraclavicular or axillary lymphadenopathy.     Orders  Orders Placed This Encounter   Procedures    Referral to Genetics     Standing Status:   Future     Expected Date:   5/7/2025     Expiration Date:   5/7/2026     Referral Priority:   Routine     Referral Type:   Consultation     Referral Reason:   Consult and Treat     Requested Specialty:   Genetics     Number of Visits Requested:   1       Visit Diagnosis  1. Encounter for follow-up surveillance of breast cancer  Clinic Appointment Request Follow Up    Clinic Appointment Request Follow Up    Referral to Genetics    tamoxifen (Nolvadex) 10 mg tablet          Assessment  High risk surveillance care, normal clinical exam, hx right breast cancer, right lumpectomy, family history  breast cancer, scattered fibroglandular tissue    Plan  Return October 2025 for bilateral diagnostic mammogram and office visit. Discussed risks and side effects of endocrine therapy and recommended. She will take Tamoxifen 10 mg for 3 years and script sent to the pharmacy. Genetics referral given.    Patient Discussion/Summary  Your clinical examination is normal. Please return October 2025 for mammogram and office visit or sooner if you have any problems or concerns.     You can see your health information, review clinical summaries from office visits & test results online when you follow your health with MY  Chart, a personal health record. To sign up go to [email or web address]. If you need assistance with signing up or trouble getting into your account call [employer] Patient Line 24/7 at [phone].    My office phone number is [phone] if you need to get in touch with me or have additional questions or concerns. Thank you for choosing [facility] and trusting me as your healthcare provider. I look forward to seeing you again at your next office visit. I am honored to be a provider on your health care team and I remain dedicated to helping you achieve your health goals.    Katrin Chairez, APRN-CNP

## (undated) DEVICE — COVER, TRANSDUCER, NEO GUARD, 2.6 X 30CM, LF

## (undated) DEVICE — Device

## (undated) DEVICE — CLIP, LIGATING, W/ADHESIVE PAD, MEDIUM, TITANIUM

## (undated) DEVICE — TOWEL, SURGICAL, NEURO, O/R, 16 X 26, BLUE, STERILE

## (undated) DEVICE — GLOVE, SURGICAL, PROTEXIS PI BLUE W/NEUTHERA, 6.5, PF, LF

## (undated) DEVICE — PROBE COVER, INTRAOPERATIVE, 13 X 244CM (5 X 96IN)

## (undated) DEVICE — KIT, MARGINMARKER, 6 INK COLORS, STANDARD

## (undated) DEVICE — DRAPE, SHEET, THREE QUARTER, FAN FOLD, 57 X 77 IN

## (undated) DEVICE — SUTURE, VICRYL PLUS 3-0, SH, 27IN

## (undated) DEVICE — PAD, GROUNDING, ELECTROSURGICAL, W/9 FT CABLE, POLYHESIVE II, ADULT, LF

## (undated) DEVICE — SUTURE, MONOCRYL, 4-0, 27 IN, PS-2, UNDYED

## (undated) DEVICE — ADHESIVE, SKIN, DERMABOND ADVANCED, 15CM, PEN-STYLE